# Patient Record
Sex: MALE | Race: WHITE | NOT HISPANIC OR LATINO | Employment: OTHER | URBAN - METROPOLITAN AREA
[De-identification: names, ages, dates, MRNs, and addresses within clinical notes are randomized per-mention and may not be internally consistent; named-entity substitution may affect disease eponyms.]

---

## 2020-01-16 ENCOUNTER — HOSPITAL ENCOUNTER (OUTPATIENT)
Dept: OTHER | Facility: HOSPITAL | Age: 55
Discharge: HOME | End: 2020-01-16
Attending: FAMILY MEDICINE

## 2020-01-16 ENCOUNTER — OFFICE VISIT CONVERTED (OUTPATIENT)
Dept: FAMILY MEDICINE CLINIC | Age: 55
End: 2020-01-16
Attending: FAMILY MEDICINE

## 2020-01-16 LAB
ALBUMIN SERPL-MCNC: 3.7 G/DL (ref 3.5–5)
ALBUMIN/GLOB SERPL: 0.9 {RATIO} (ref 1.4–2.6)
ALP SERPL-CCNC: 80 U/L (ref 56–119)
ALT SERPL-CCNC: 29 U/L (ref 10–40)
ANION GAP SERPL CALC-SCNC: 16 MMOL/L (ref 8–19)
AST SERPL-CCNC: 16 U/L (ref 15–50)
BASOPHILS # BLD MANUAL: 0.05 10*3/UL (ref 0–0.2)
BASOPHILS NFR BLD MANUAL: 0.4 % (ref 0–3)
BILIRUB SERPL-MCNC: 0.65 MG/DL (ref 0.2–1.3)
BUN SERPL-MCNC: 15 MG/DL (ref 5–25)
BUN/CREAT SERPL: 14 {RATIO} (ref 6–20)
CALCIUM SERPL-MCNC: 9.4 MG/DL (ref 8.7–10.4)
CHLORIDE SERPL-SCNC: 97 MMOL/L (ref 99–111)
CONV CO2: 24 MMOL/L (ref 22–32)
CONV TOTAL PROTEIN: 7.9 G/DL (ref 6.3–8.2)
CREAT UR-MCNC: 1.06 MG/DL (ref 0.7–1.2)
DEPRECATED RDW RBC AUTO: 44.1 FL
EOSINOPHIL # BLD MANUAL: 0.18 10*3/UL (ref 0–0.7)
EOSINOPHIL NFR BLD MANUAL: 1.5 % (ref 0–7)
ERYTHROCYTE [DISTWIDTH] IN BLOOD BY AUTOMATED COUNT: 12.9 % (ref 11.5–14.5)
GFR SERPLBLD BASED ON 1.73 SQ M-ARVRAT: >60 ML/MIN/{1.73_M2}
GLOBULIN UR ELPH-MCNC: 4.2 G/DL (ref 2–3.5)
GLUCOSE SERPL-MCNC: 135 MG/DL (ref 70–99)
GRANS (ABSOLUTE): 8.67 10*3/UL (ref 2–8)
GRANS: 72.4 % (ref 30–85)
HBA1C MFR BLD: 14.6 G/DL (ref 14–18)
HCT VFR BLD AUTO: 43.5 % (ref 42–52)
IMM GRANULOCYTES # BLD: 0.05 10*3/UL (ref 0–0.54)
IMM GRANULOCYTES NFR BLD: 0.4 % (ref 0–0.43)
LYMPHOCYTES # BLD MANUAL: 1.7 10*3/UL (ref 1–5)
LYMPHOCYTES NFR BLD MANUAL: 11.1 % (ref 3–10)
MCH RBC QN AUTO: 30.7 PG (ref 27–31)
MCHC RBC AUTO-ENTMCNC: 33.6 G/DL (ref 33–37)
MCV RBC AUTO: 91.4 FL (ref 80–96)
MONOCYTES # BLD AUTO: 1.33 10*3/UL (ref 0.2–1.2)
OSMOLALITY SERPL CALC.SUM OF ELEC: 277 MOSM/KG (ref 273–304)
PLATELET # BLD AUTO: 388 10*3/UL (ref 130–400)
PMV BLD AUTO: 10.6 FL (ref 7.4–10.4)
POTASSIUM SERPL-SCNC: 4.5 MMOL/L (ref 3.5–5.3)
RBC # BLD AUTO: 4.76 10*6/UL (ref 4.7–6.1)
SODIUM SERPL-SCNC: 132 MMOL/L (ref 135–147)
VARIANT LYMPHS NFR BLD MANUAL: 14.2 % (ref 20–45)
WBC # BLD AUTO: 11.98 10*3/UL (ref 4.8–10.8)

## 2020-07-25 ENCOUNTER — APPOINTMENT (OUTPATIENT)
Dept: GENERAL RADIOLOGY | Facility: HOSPITAL | Age: 55
End: 2020-07-25

## 2020-07-25 PROCEDURE — 93005 ELECTROCARDIOGRAM TRACING: CPT | Performed by: EMERGENCY MEDICINE

## 2020-07-25 PROCEDURE — 36415 COLL VENOUS BLD VENIPUNCTURE: CPT

## 2020-07-25 PROCEDURE — 93005 ELECTROCARDIOGRAM TRACING: CPT

## 2020-07-25 PROCEDURE — 99285 EMERGENCY DEPT VISIT HI MDM: CPT

## 2020-07-25 PROCEDURE — 71046 X-RAY EXAM CHEST 2 VIEWS: CPT

## 2020-07-25 PROCEDURE — 93010 ELECTROCARDIOGRAM REPORT: CPT | Performed by: INTERNAL MEDICINE

## 2020-07-25 RX ORDER — SODIUM CHLORIDE 0.9 % (FLUSH) 0.9 %
10 SYRINGE (ML) INJECTION AS NEEDED
Status: DISCONTINUED | OUTPATIENT
Start: 2020-07-25 | End: 2020-07-27 | Stop reason: HOSPADM

## 2020-07-25 RX ORDER — ASPIRIN 325 MG
325 TABLET ORAL ONCE
Status: DISCONTINUED | OUTPATIENT
Start: 2020-07-25 | End: 2020-07-26

## 2020-07-26 ENCOUNTER — HOSPITAL ENCOUNTER (INPATIENT)
Facility: HOSPITAL | Age: 55
LOS: 1 days | Discharge: HOME | End: 2020-07-27
Attending: EMERGENCY MEDICINE | Admitting: INTERNAL MEDICINE

## 2020-07-26 DIAGNOSIS — I10 HYPERTENSION, UNSPECIFIED TYPE: ICD-10-CM

## 2020-07-26 DIAGNOSIS — I21.4 NSTEMI (NON-ST ELEVATED MYOCARDIAL INFARCTION) (HCC): Primary | ICD-10-CM

## 2020-07-26 LAB
ALBUMIN SERPL-MCNC: 3.9 G/DL (ref 3.5–5.2)
ALBUMIN/GLOB SERPL: 1.3 G/DL
ALP SERPL-CCNC: 61 U/L (ref 39–117)
ALT SERPL W P-5'-P-CCNC: 24 U/L (ref 1–41)
ANION GAP SERPL CALCULATED.3IONS-SCNC: 6.5 MMOL/L (ref 5–15)
AST SERPL-CCNC: 14 U/L (ref 1–40)
B PARAPERT DNA SPEC QL NAA+PROBE: NOT DETECTED
B PERT DNA SPEC QL NAA+PROBE: NOT DETECTED
BASOPHILS # BLD AUTO: 0.07 10*3/MM3 (ref 0–0.2)
BASOPHILS NFR BLD AUTO: 0.6 % (ref 0–1.5)
BILIRUB SERPL-MCNC: 0.8 MG/DL (ref 0–1.2)
BUN SERPL-MCNC: 13 MG/DL (ref 6–20)
BUN/CREAT SERPL: 10.2 (ref 7–25)
C PNEUM DNA NPH QL NAA+NON-PROBE: NOT DETECTED
CALCIUM SPEC-SCNC: 9.3 MG/DL (ref 8.6–10.5)
CHLORIDE SERPL-SCNC: 102 MMOL/L (ref 98–107)
CO2 SERPL-SCNC: 27.5 MMOL/L (ref 22–29)
CREAT SERPL-MCNC: 1.28 MG/DL (ref 0.76–1.27)
DEPRECATED RDW RBC AUTO: 47 FL (ref 37–54)
EOSINOPHIL # BLD AUTO: 0.17 10*3/MM3 (ref 0–0.4)
EOSINOPHIL NFR BLD AUTO: 1.5 % (ref 0.3–6.2)
ERYTHROCYTE [DISTWIDTH] IN BLOOD BY AUTOMATED COUNT: 13.2 % (ref 12.3–15.4)
FLUAV H1 2009 PAND RNA NPH QL NAA+PROBE: NOT DETECTED
FLUAV H1 HA GENE NPH QL NAA+PROBE: NOT DETECTED
FLUAV H3 RNA NPH QL NAA+PROBE: NOT DETECTED
FLUAV SUBTYP SPEC NAA+PROBE: NOT DETECTED
FLUBV RNA ISLT QL NAA+PROBE: NOT DETECTED
GFR SERPL CREATININE-BSD FRML MDRD: 59 ML/MIN/1.73
GLOBULIN UR ELPH-MCNC: 2.9 GM/DL
GLUCOSE SERPL-MCNC: 180 MG/DL (ref 65–99)
HADV DNA SPEC NAA+PROBE: NOT DETECTED
HCOV 229E RNA SPEC QL NAA+PROBE: NOT DETECTED
HCOV HKU1 RNA SPEC QL NAA+PROBE: NOT DETECTED
HCOV NL63 RNA SPEC QL NAA+PROBE: NOT DETECTED
HCOV OC43 RNA SPEC QL NAA+PROBE: NOT DETECTED
HCT VFR BLD AUTO: 47 % (ref 37.5–51)
HGB BLD-MCNC: 15.6 G/DL (ref 13–17.7)
HMPV RNA NPH QL NAA+NON-PROBE: NOT DETECTED
HOLD SPECIMEN: NORMAL
HOLD SPECIMEN: NORMAL
HPIV1 RNA SPEC QL NAA+PROBE: NOT DETECTED
HPIV2 RNA SPEC QL NAA+PROBE: NOT DETECTED
HPIV3 RNA NPH QL NAA+PROBE: NOT DETECTED
HPIV4 P GENE NPH QL NAA+PROBE: NOT DETECTED
IMM GRANULOCYTES # BLD AUTO: 0.03 10*3/MM3 (ref 0–0.05)
IMM GRANULOCYTES NFR BLD AUTO: 0.3 % (ref 0–0.5)
LYMPHOCYTES # BLD AUTO: 1.84 10*3/MM3 (ref 0.7–3.1)
LYMPHOCYTES NFR BLD AUTO: 15.9 % (ref 19.6–45.3)
M PNEUMO IGG SER IA-ACNC: NOT DETECTED
MCH RBC QN AUTO: 31.6 PG (ref 26.6–33)
MCHC RBC AUTO-ENTMCNC: 33.2 G/DL (ref 31.5–35.7)
MCV RBC AUTO: 95.3 FL (ref 79–97)
MONOCYTES # BLD AUTO: 0.76 10*3/MM3 (ref 0.1–0.9)
MONOCYTES NFR BLD AUTO: 6.6 % (ref 5–12)
NEUTROPHILS NFR BLD AUTO: 75.1 % (ref 42.7–76)
NEUTROPHILS NFR BLD AUTO: 8.69 10*3/MM3 (ref 1.7–7)
NRBC BLD AUTO-RTO: 0 /100 WBC (ref 0–0.2)
PLATELET # BLD AUTO: 228 10*3/MM3 (ref 140–450)
PMV BLD AUTO: 10.5 FL (ref 6–12)
POTASSIUM SERPL-SCNC: 4.1 MMOL/L (ref 3.5–5.2)
PROT SERPL-MCNC: 6.8 G/DL (ref 6–8.5)
RBC # BLD AUTO: 4.93 10*6/MM3 (ref 4.14–5.8)
RHINOVIRUS RNA SPEC NAA+PROBE: NOT DETECTED
RSV RNA NPH QL NAA+NON-PROBE: NOT DETECTED
SARS-COV-2 RNA NPH QL NAA+NON-PROBE: NOT DETECTED
SODIUM SERPL-SCNC: 136 MMOL/L (ref 136–145)
TROPONIN T SERPL-MCNC: 0.14 NG/ML (ref 0–0.03)
TROPONIN T SERPL-MCNC: 0.16 NG/ML (ref 0–0.03)
TROPONIN T SERPL-MCNC: 0.23 NG/ML (ref 0–0.03)
WBC # BLD AUTO: 11.56 10*3/MM3 (ref 3.4–10.8)
WHOLE BLOOD HOLD SPECIMEN: NORMAL
WHOLE BLOOD HOLD SPECIMEN: NORMAL

## 2020-07-26 PROCEDURE — C1769 GUIDE WIRE: HCPCS | Performed by: INTERNAL MEDICINE

## 2020-07-26 PROCEDURE — 0202U NFCT DS 22 TRGT SARS-COV-2: CPT | Performed by: INTERNAL MEDICINE

## 2020-07-26 PROCEDURE — 4A023N7 MEASUREMENT OF CARDIAC SAMPLING AND PRESSURE, LEFT HEART, PERCUTANEOUS APPROACH: ICD-10-PCS | Performed by: INTERNAL MEDICINE

## 2020-07-26 PROCEDURE — C1894 INTRO/SHEATH, NON-LASER: HCPCS | Performed by: INTERNAL MEDICINE

## 2020-07-26 PROCEDURE — 93458 L HRT ARTERY/VENTRICLE ANGIO: CPT | Performed by: INTERNAL MEDICINE

## 2020-07-26 PROCEDURE — 25010000002 MIDAZOLAM PER 1 MG: Performed by: INTERNAL MEDICINE

## 2020-07-26 PROCEDURE — 99152 MOD SED SAME PHYS/QHP 5/>YRS: CPT | Performed by: INTERNAL MEDICINE

## 2020-07-26 PROCEDURE — 99153 MOD SED SAME PHYS/QHP EA: CPT | Performed by: INTERNAL MEDICINE

## 2020-07-26 PROCEDURE — C1874 STENT, COATED/COV W/DEL SYS: HCPCS | Performed by: INTERNAL MEDICINE

## 2020-07-26 PROCEDURE — 84484 ASSAY OF TROPONIN QUANT: CPT | Performed by: EMERGENCY MEDICINE

## 2020-07-26 PROCEDURE — 25010000002 ENOXAPARIN PER 10 MG: Performed by: EMERGENCY MEDICINE

## 2020-07-26 PROCEDURE — C1887 CATHETER, GUIDING: HCPCS | Performed by: INTERNAL MEDICINE

## 2020-07-26 PROCEDURE — 25010000002 HEPARIN (PORCINE) PER 1000 UNITS: Performed by: INTERNAL MEDICINE

## 2020-07-26 PROCEDURE — 027034Z DILATION OF CORONARY ARTERY, ONE ARTERY WITH DRUG-ELUTING INTRALUMINAL DEVICE, PERCUTANEOUS APPROACH: ICD-10-PCS | Performed by: INTERNAL MEDICINE

## 2020-07-26 PROCEDURE — 93005 ELECTROCARDIOGRAM TRACING: CPT | Performed by: INTERNAL MEDICINE

## 2020-07-26 PROCEDURE — C1725 CATH, TRANSLUMIN NON-LASER: HCPCS | Performed by: INTERNAL MEDICINE

## 2020-07-26 PROCEDURE — 99223 1ST HOSP IP/OBS HIGH 75: CPT | Performed by: INTERNAL MEDICINE

## 2020-07-26 PROCEDURE — 85025 COMPLETE CBC W/AUTO DIFF WBC: CPT

## 2020-07-26 PROCEDURE — 92928 PRQ TCAT PLMT NTRAC ST 1 LES: CPT | Performed by: INTERNAL MEDICINE

## 2020-07-26 PROCEDURE — 25010000002 FENTANYL CITRATE (PF) 100 MCG/2ML SOLUTION: Performed by: INTERNAL MEDICINE

## 2020-07-26 PROCEDURE — 85347 COAGULATION TIME ACTIVATED: CPT

## 2020-07-26 PROCEDURE — B2111ZZ FLUOROSCOPY OF MULTIPLE CORONARY ARTERIES USING LOW OSMOLAR CONTRAST: ICD-10-PCS | Performed by: INTERNAL MEDICINE

## 2020-07-26 PROCEDURE — 84484 ASSAY OF TROPONIN QUANT: CPT | Performed by: INTERNAL MEDICINE

## 2020-07-26 PROCEDURE — 84484 ASSAY OF TROPONIN QUANT: CPT

## 2020-07-26 PROCEDURE — 93010 ELECTROCARDIOGRAM REPORT: CPT | Performed by: INTERNAL MEDICINE

## 2020-07-26 PROCEDURE — 0 IOPAMIDOL PER 1 ML: Performed by: INTERNAL MEDICINE

## 2020-07-26 PROCEDURE — 80053 COMPREHEN METABOLIC PANEL: CPT

## 2020-07-26 PROCEDURE — C9600 PERC DRUG-EL COR STENT SING: HCPCS | Performed by: INTERNAL MEDICINE

## 2020-07-26 DEVICE — XIENCE SIERRA™ EVEROLIMUS ELUTING CORONARY STENT SYSTEM 3.50 MM X 18 MM / RAPID-EXCHANGE
Type: IMPLANTABLE DEVICE | Status: FUNCTIONAL
Brand: XIENCE SIERRA™

## 2020-07-26 RX ORDER — ASPIRIN 81 MG/1
TABLET, CHEWABLE ORAL AS NEEDED
Status: DISCONTINUED | OUTPATIENT
Start: 2020-07-26 | End: 2020-07-26 | Stop reason: HOSPADM

## 2020-07-26 RX ORDER — CLOPIDOGREL BISULFATE 75 MG/1
75 TABLET ORAL DAILY
Status: DISCONTINUED | OUTPATIENT
Start: 2020-07-27 | End: 2020-07-27 | Stop reason: HOSPADM

## 2020-07-26 RX ORDER — FENTANYL CITRATE 50 UG/ML
INJECTION, SOLUTION INTRAMUSCULAR; INTRAVENOUS AS NEEDED
Status: DISCONTINUED | OUTPATIENT
Start: 2020-07-26 | End: 2020-07-26 | Stop reason: HOSPADM

## 2020-07-26 RX ORDER — ATORVASTATIN CALCIUM 20 MG/1
40 TABLET, FILM COATED ORAL NIGHTLY
Status: DISCONTINUED | OUTPATIENT
Start: 2020-07-26 | End: 2020-07-27 | Stop reason: HOSPADM

## 2020-07-26 RX ORDER — MIDAZOLAM HYDROCHLORIDE 1 MG/ML
INJECTION INTRAMUSCULAR; INTRAVENOUS AS NEEDED
Status: DISCONTINUED | OUTPATIENT
Start: 2020-07-26 | End: 2020-07-26 | Stop reason: HOSPADM

## 2020-07-26 RX ORDER — SODIUM CHLORIDE 9 MG/ML
INJECTION, SOLUTION INTRAVENOUS CONTINUOUS PRN
Status: COMPLETED | OUTPATIENT
Start: 2020-07-26 | End: 2020-07-26

## 2020-07-26 RX ORDER — ONDANSETRON 4 MG/1
4 TABLET, FILM COATED ORAL EVERY 6 HOURS PRN
Status: DISCONTINUED | OUTPATIENT
Start: 2020-07-26 | End: 2020-07-27 | Stop reason: HOSPADM

## 2020-07-26 RX ORDER — CLOPIDOGREL BISULFATE 75 MG/1
TABLET ORAL AS NEEDED
Status: DISCONTINUED | OUTPATIENT
Start: 2020-07-26 | End: 2020-07-26 | Stop reason: HOSPADM

## 2020-07-26 RX ORDER — NITROGLYCERIN 0.4 MG/1
0.4 TABLET SUBLINGUAL
Status: DISCONTINUED | OUTPATIENT
Start: 2020-07-26 | End: 2020-07-27 | Stop reason: HOSPADM

## 2020-07-26 RX ORDER — ACETAMINOPHEN 650 MG/1
650 SUPPOSITORY RECTAL EVERY 4 HOURS PRN
Status: DISCONTINUED | OUTPATIENT
Start: 2020-07-26 | End: 2020-07-27 | Stop reason: HOSPADM

## 2020-07-26 RX ORDER — CARVEDILOL 3.12 MG/1
3.12 TABLET ORAL EVERY 12 HOURS SCHEDULED
Status: DISCONTINUED | OUTPATIENT
Start: 2020-07-26 | End: 2020-07-27 | Stop reason: HOSPADM

## 2020-07-26 RX ORDER — ACETAMINOPHEN 325 MG/1
650 TABLET ORAL EVERY 4 HOURS PRN
Status: DISCONTINUED | OUTPATIENT
Start: 2020-07-26 | End: 2020-07-27 | Stop reason: HOSPADM

## 2020-07-26 RX ORDER — SODIUM CHLORIDE 0.9 % (FLUSH) 0.9 %
10 SYRINGE (ML) INJECTION AS NEEDED
Status: DISCONTINUED | OUTPATIENT
Start: 2020-07-26 | End: 2020-07-27 | Stop reason: HOSPADM

## 2020-07-26 RX ORDER — LIDOCAINE HYDROCHLORIDE 20 MG/ML
INJECTION, SOLUTION INFILTRATION; PERINEURAL AS NEEDED
Status: DISCONTINUED | OUTPATIENT
Start: 2020-07-26 | End: 2020-07-26 | Stop reason: HOSPADM

## 2020-07-26 RX ORDER — ASPIRIN 81 MG/1
81 TABLET ORAL DAILY
Status: DISCONTINUED | OUTPATIENT
Start: 2020-07-26 | End: 2020-07-27 | Stop reason: HOSPADM

## 2020-07-26 RX ORDER — SODIUM CHLORIDE 0.9 % (FLUSH) 0.9 %
10 SYRINGE (ML) INJECTION EVERY 12 HOURS SCHEDULED
Status: DISCONTINUED | OUTPATIENT
Start: 2020-07-26 | End: 2020-07-27 | Stop reason: HOSPADM

## 2020-07-26 RX ORDER — SODIUM CHLORIDE 9 MG/ML
100 INJECTION, SOLUTION INTRAVENOUS CONTINUOUS
Status: ACTIVE | OUTPATIENT
Start: 2020-07-26 | End: 2020-07-26

## 2020-07-26 RX ORDER — ONDANSETRON 2 MG/ML
4 INJECTION INTRAMUSCULAR; INTRAVENOUS EVERY 6 HOURS PRN
Status: DISCONTINUED | OUTPATIENT
Start: 2020-07-26 | End: 2020-07-27 | Stop reason: HOSPADM

## 2020-07-26 RX ORDER — ACETAMINOPHEN 160 MG/5ML
650 SOLUTION ORAL EVERY 4 HOURS PRN
Status: DISCONTINUED | OUTPATIENT
Start: 2020-07-26 | End: 2020-07-27 | Stop reason: HOSPADM

## 2020-07-26 RX ORDER — HEPARIN SODIUM 1000 [USP'U]/ML
INJECTION, SOLUTION INTRAVENOUS; SUBCUTANEOUS AS NEEDED
Status: DISCONTINUED | OUTPATIENT
Start: 2020-07-26 | End: 2020-07-26 | Stop reason: HOSPADM

## 2020-07-26 RX ADMIN — ACETAMINOPHEN 650 MG: 325 TABLET, FILM COATED ORAL at 23:32

## 2020-07-26 RX ADMIN — ENOXAPARIN SODIUM 80 MG: 80 INJECTION SUBCUTANEOUS at 03:21

## 2020-07-26 RX ADMIN — CARVEDILOL 3.12 MG: 3.12 TABLET, FILM COATED ORAL at 21:21

## 2020-07-26 RX ADMIN — NITROGLYCERIN 1 INCH: 20 OINTMENT TOPICAL at 02:28

## 2020-07-26 RX ADMIN — ATORVASTATIN CALCIUM 40 MG: 20 TABLET, FILM COATED ORAL at 21:21

## 2020-07-26 RX ADMIN — SODIUM CHLORIDE, PRESERVATIVE FREE 10 ML: 5 INJECTION INTRAVENOUS at 21:21

## 2020-07-26 RX ADMIN — SODIUM CHLORIDE, PRESERVATIVE FREE 10 ML: 5 INJECTION INTRAVENOUS at 21:20

## 2020-07-26 RX ADMIN — ACETAMINOPHEN 650 MG: 325 TABLET, FILM COATED ORAL at 13:53

## 2020-07-26 RX ADMIN — NITROGLYCERIN 0.4 MG: 0.4 TABLET SUBLINGUAL at 02:28

## 2020-07-26 RX ADMIN — CARVEDILOL 3.12 MG: 3.12 TABLET, FILM COATED ORAL at 10:31

## 2020-07-27 ENCOUNTER — APPOINTMENT (OUTPATIENT)
Dept: CARDIOLOGY | Facility: HOSPITAL | Age: 55
End: 2020-07-27

## 2020-07-27 VITALS
HEART RATE: 56 BPM | RESPIRATION RATE: 16 BRPM | TEMPERATURE: 97.5 F | SYSTOLIC BLOOD PRESSURE: 151 MMHG | DIASTOLIC BLOOD PRESSURE: 84 MMHG | BODY MASS INDEX: 26.77 KG/M2 | OXYGEN SATURATION: 98 % | HEIGHT: 70 IN | WEIGHT: 187 LBS

## 2020-07-27 LAB
ANION GAP SERPL CALCULATED.3IONS-SCNC: 7.3 MMOL/L (ref 5–15)
AORTIC DIMENSIONLESS INDEX: 0.9 (DI)
ASCENDING AORTA: 3.5 CM
BH CV ECHO MEAS - ACS: 2 CM
BH CV ECHO MEAS - AO MAX PG: 9.7 MMHG
BH CV ECHO MEAS - AO MEAN PG (FULL): 1 MMHG
BH CV ECHO MEAS - AO MEAN PG: 4 MMHG
BH CV ECHO MEAS - AO ROOT AREA (BSA CORRECTED): 1.1
BH CV ECHO MEAS - AO ROOT AREA: 4.2 CM^2
BH CV ECHO MEAS - AO ROOT DIAM: 2.3 CM
BH CV ECHO MEAS - AO V2 MAX: 155.8 CM/SEC
BH CV ECHO MEAS - AO V2 MEAN: 91.2 CM/SEC
BH CV ECHO MEAS - AO V2 VTI: 24 CM
BH CV ECHO MEAS - ASC AORTA: 2.9 CM
BH CV ECHO MEAS - AVA(I,A): 3.2 CM^2
BH CV ECHO MEAS - AVA(I,D): 3.2 CM^2
BH CV ECHO MEAS - BSA(HAYCOCK): 2.1 M^2
BH CV ECHO MEAS - BSA: 2 M^2
BH CV ECHO MEAS - BZI_BMI: 26.8 KILOGRAMS/M^2
BH CV ECHO MEAS - BZI_METRIC_HEIGHT: 177.8 CM
BH CV ECHO MEAS - BZI_METRIC_WEIGHT: 84.8 KG
BH CV ECHO MEAS - EDV(CUBED): 74.1 ML
BH CV ECHO MEAS - EDV(MOD-SP2): 108 ML
BH CV ECHO MEAS - EDV(MOD-SP4): 134 ML
BH CV ECHO MEAS - EDV(TEICH): 78.6 ML
BH CV ECHO MEAS - EF(CUBED): 70.4 %
BH CV ECHO MEAS - EF(MOD-BP): 70 %
BH CV ECHO MEAS - EF(MOD-SP2): 71.3 %
BH CV ECHO MEAS - EF(MOD-SP4): 70.1 %
BH CV ECHO MEAS - EF(TEICH): 62.4 %
BH CV ECHO MEAS - ESV(CUBED): 22 ML
BH CV ECHO MEAS - ESV(MOD-SP2): 31 ML
BH CV ECHO MEAS - ESV(MOD-SP4): 40 ML
BH CV ECHO MEAS - ESV(TEICH): 29.6 ML
BH CV ECHO MEAS - FS: 33.3 %
BH CV ECHO MEAS - IVS/LVPW: 1.1
BH CV ECHO MEAS - IVSD: 1.6 CM
BH CV ECHO MEAS - LAT PEAK E' VEL: 10.2 CM/SEC
BH CV ECHO MEAS - LV DIASTOLIC VOL/BSA (35-75): 66.1 ML/M^2
BH CV ECHO MEAS - LV MASS(C)D: 262.6 GRAMS
BH CV ECHO MEAS - LV MASS(C)DI: 129.5 GRAMS/M^2
BH CV ECHO MEAS - LV MAX PG: 7.1 MMHG
BH CV ECHO MEAS - LV MEAN PG: 3 MMHG
BH CV ECHO MEAS - LV SYSTOLIC VOL/BSA (12-30): 19.7 ML/M^2
BH CV ECHO MEAS - LV V1 MAX: 133.3 CM/SEC
BH CV ECHO MEAS - LV V1 MEAN: 75 CM/SEC
BH CV ECHO MEAS - LV V1 VTI: 22.4 CM
BH CV ECHO MEAS - LVIDD: 4.2 CM
BH CV ECHO MEAS - LVIDS: 2.8 CM
BH CV ECHO MEAS - LVLD AP2: 9.5 CM
BH CV ECHO MEAS - LVLD AP4: 10.5 CM
BH CV ECHO MEAS - LVLS AP2: 7.7 CM
BH CV ECHO MEAS - LVLS AP4: 8.8 CM
BH CV ECHO MEAS - LVOT AREA (M): 3.5 CM^2
BH CV ECHO MEAS - LVOT AREA: 3.5 CM^2
BH CV ECHO MEAS - LVOT DIAM: 2.1 CM
BH CV ECHO MEAS - LVPWD: 1.5 CM
BH CV ECHO MEAS - MED PEAK E' VEL: 8.55 CM/SEC
BH CV ECHO MEAS - MV A DUR: 0.15 SEC
BH CV ECHO MEAS - MV A MAX VEL: 82.4 CM/SEC
BH CV ECHO MEAS - MV DEC SLOPE: 407 CM/SEC^2
BH CV ECHO MEAS - MV DEC TIME: 204 SEC
BH CV ECHO MEAS - MV E MAX VEL: 59.2 CM/SEC
BH CV ECHO MEAS - MV E/A: 0.72
BH CV ECHO MEAS - MV MEAN PG: 1 MMHG
BH CV ECHO MEAS - MV P1/2T MAX VEL: 102 CM/SEC
BH CV ECHO MEAS - MV P1/2T: 73.4 MSEC
BH CV ECHO MEAS - MV V2 MEAN: 54.5 CM/SEC
BH CV ECHO MEAS - MV V2 VTI: 36.6 CM
BH CV ECHO MEAS - MVA P1/2T LCG: 2.2 CM^2
BH CV ECHO MEAS - MVA(P1/2T): 3 CM^2
BH CV ECHO MEAS - MVA(VTI): 2.1 CM^2
BH CV ECHO MEAS - PA ACC SLOPE: 23.1 CM/SEC^2
BH CV ECHO MEAS - PA ACC TIME: 0.18 SEC
BH CV ECHO MEAS - PA MAX PG (FULL): 1.1 MMHG
BH CV ECHO MEAS - PA MAX PG: 2.1 MMHG
BH CV ECHO MEAS - PA PR(ACCEL): -0.2 MMHG
BH CV ECHO MEAS - PA V2 MAX: 71.9 CM/SEC
BH CV ECHO MEAS - PULM A REVS DUR: 0.14 SEC
BH CV ECHO MEAS - PULM A REVS VEL: 21.6 CM/SEC
BH CV ECHO MEAS - PULM DIAS VEL: 48.2 CM/SEC
BH CV ECHO MEAS - PULM S/D: 1
BH CV ECHO MEAS - PULM SYS VEL: 48.2 CM/SEC
BH CV ECHO MEAS - PVA(V,A): 1.7 CM^2
BH CV ECHO MEAS - PVA(V,D): 1.7 CM^2
BH CV ECHO MEAS - QP/QS: 0.26
BH CV ECHO MEAS - RV MAX PG: 0.98 MMHG
BH CV ECHO MEAS - RV MEAN PG: 0 MMHG
BH CV ECHO MEAS - RV V1 MAX: 49.4 CM/SEC
BH CV ECHO MEAS - RV V1 MEAN: 31.1 CM/SEC
BH CV ECHO MEAS - RV V1 VTI: 8.1 CM
BH CV ECHO MEAS - RVOT AREA: 2.5 CM^2
BH CV ECHO MEAS - RVOT DIAM: 1.8 CM
BH CV ECHO MEAS - SI(AO): 49.2 ML/M^2
BH CV ECHO MEAS - SI(CUBED): 25.7 ML/M^2
BH CV ECHO MEAS - SI(LVOT): 38.2 ML/M^2
BH CV ECHO MEAS - SI(MOD-SP2): 38 ML/M^2
BH CV ECHO MEAS - SI(MOD-SP4): 46.3 ML/M^2
BH CV ECHO MEAS - SI(TEICH): 24.2 ML/M^2
BH CV ECHO MEAS - SV(AO): 99.7 ML
BH CV ECHO MEAS - SV(CUBED): 52.1 ML
BH CV ECHO MEAS - SV(LVOT): 77.6 ML
BH CV ECHO MEAS - SV(MOD-SP2): 77 ML
BH CV ECHO MEAS - SV(MOD-SP4): 94 ML
BH CV ECHO MEAS - SV(RVOT): 20.5 ML
BH CV ECHO MEAS - SV(TEICH): 49 ML
BH CV ECHO MEAS - TAPSE (>1.6): 2.8 CM2
BH CV ECHO MEASUREMENTS AVERAGE E/E' RATIO: 6.31
BH CV VAS BP RIGHT ARM: NORMAL MMHG
BH CV XLRA - RV BASE: 3.4 CM
BH CV XLRA - RV LENGTH: 6.7 CM
BH CV XLRA - RV MID: 2.1 CM
BH CV XLRA - TDI S': 15.9 CM/SEC
BUN SERPL-MCNC: 13 MG/DL (ref 6–20)
BUN/CREAT SERPL: 11.4 (ref 7–25)
CALCIUM SPEC-SCNC: 8.6 MG/DL (ref 8.6–10.5)
CHLORIDE SERPL-SCNC: 105 MMOL/L (ref 98–107)
CHOLEST SERPL-MCNC: 85 MG/DL (ref 0–200)
CO2 SERPL-SCNC: 24.7 MMOL/L (ref 22–29)
CREAT SERPL-MCNC: 1.14 MG/DL (ref 0.76–1.27)
GFR SERPL CREATININE-BSD FRML MDRD: 67 ML/MIN/1.73
GLUCOSE SERPL-MCNC: 122 MG/DL (ref 65–99)
HDLC SERPL-MCNC: 27 MG/DL (ref 40–60)
LDLC SERPL CALC-MCNC: 38 MG/DL (ref 0–100)
LDLC/HDLC SERPL: 1.39 {RATIO}
MAXIMAL PREDICTED HEART RATE: 166 BPM
POTASSIUM SERPL-SCNC: 4 MMOL/L (ref 3.5–5.2)
SINUS: 2.94 CM
SODIUM SERPL-SCNC: 137 MMOL/L (ref 136–145)
STJ: 3 CM
STRESS TARGET HR: 141 BPM
TRIGL SERPL-MCNC: 102 MG/DL (ref 0–150)
VLDLC SERPL-MCNC: 20.4 MG/DL (ref 5–40)

## 2020-07-27 PROCEDURE — 93306 TTE W/DOPPLER COMPLETE: CPT

## 2020-07-27 PROCEDURE — 80061 LIPID PANEL: CPT | Performed by: INTERNAL MEDICINE

## 2020-07-27 PROCEDURE — 80048 BASIC METABOLIC PNL TOTAL CA: CPT | Performed by: INTERNAL MEDICINE

## 2020-07-27 PROCEDURE — 93005 ELECTROCARDIOGRAM TRACING: CPT | Performed by: INTERNAL MEDICINE

## 2020-07-27 PROCEDURE — 93010 ELECTROCARDIOGRAM REPORT: CPT | Performed by: INTERNAL MEDICINE

## 2020-07-27 PROCEDURE — 93306 TTE W/DOPPLER COMPLETE: CPT | Performed by: INTERNAL MEDICINE

## 2020-07-27 PROCEDURE — 99239 HOSP IP/OBS DSCHRG MGMT >30: CPT | Performed by: INTERNAL MEDICINE

## 2020-07-27 PROCEDURE — 25010000002 PERFLUTREN (DEFINITY) 8.476 MG IN SODIUM CHLORIDE 0.9 % 10 ML INJECTION: Performed by: INTERNAL MEDICINE

## 2020-07-27 RX ORDER — CLOPIDOGREL BISULFATE 75 MG/1
75 TABLET ORAL DAILY
Qty: 90 TABLET | Refills: 3 | Status: ON HOLD | OUTPATIENT
Start: 2020-07-27 | End: 2021-02-25 | Stop reason: SDUPTHER

## 2020-07-27 RX ORDER — ATORVASTATIN CALCIUM 40 MG/1
40 TABLET, FILM COATED ORAL NIGHTLY
Qty: 90 TABLET | Refills: 3 | Status: ON HOLD | OUTPATIENT
Start: 2020-07-27 | End: 2021-02-25 | Stop reason: SDUPTHER

## 2020-07-27 RX ORDER — CARVEDILOL 3.12 MG/1
3.12 TABLET ORAL EVERY 12 HOURS SCHEDULED
Qty: 180 TABLET | Refills: 3 | Status: SHIPPED | OUTPATIENT
Start: 2020-07-27 | End: 2021-02-25 | Stop reason: HOSPADM

## 2020-07-27 RX ORDER — ASPIRIN 81 MG/1
81 TABLET ORAL DAILY
Qty: 90 TABLET | Refills: 3 | Status: SHIPPED | OUTPATIENT
Start: 2020-07-27 | End: 2022-03-11 | Stop reason: SDUPTHER

## 2020-07-27 RX ORDER — NITROGLYCERIN 0.4 MG/1
0.4 TABLET SUBLINGUAL
Qty: 25 TABLET | Refills: 12 | Status: SHIPPED | OUTPATIENT
Start: 2020-07-27 | End: 2021-02-25 | Stop reason: HOSPADM

## 2020-07-27 RX ADMIN — CARVEDILOL 3.12 MG: 3.12 TABLET, FILM COATED ORAL at 08:20

## 2020-07-27 RX ADMIN — ASPIRIN 81 MG: 81 TABLET, COATED ORAL at 08:20

## 2020-07-27 RX ADMIN — PERFLUTREN 8 ML: 6.52 INJECTION, SUSPENSION INTRAVENOUS at 08:43

## 2020-07-27 RX ADMIN — CLOPIDOGREL 75 MG: 75 TABLET, FILM COATED ORAL at 08:20

## 2020-07-28 ENCOUNTER — READMISSION MANAGEMENT (OUTPATIENT)
Dept: CALL CENTER | Facility: HOSPITAL | Age: 55
End: 2020-07-28

## 2020-07-29 ENCOUNTER — READMISSION MANAGEMENT (OUTPATIENT)
Dept: CALL CENTER | Facility: HOSPITAL | Age: 55
End: 2020-07-29

## 2020-07-29 NOTE — OUTREACH NOTE
AMI Week 1 Survey      Responses   [facility] patient discharged from?  [place]   Does the patient have one of the following disease processes/diagnoses(primary or secondary)?  Acute MI (STEMI,NSTEMI)   Is there a successful TCM telephone encounter documented?  No   Week 1 attempt successful?  No   Unsuccessful attempts  Attempt 1          Yanira Whaley RN

## 2020-07-30 ENCOUNTER — TELEPHONE (OUTPATIENT)
Dept: CARDIAC REHAB | Facility: HOSPITAL | Age: 55
End: 2020-07-30

## 2020-07-31 LAB — ACT BLD: 235 SECONDS (ref 82–152)

## 2020-08-03 ENCOUNTER — READMISSION MANAGEMENT (OUTPATIENT)
Dept: CALL CENTER | Facility: HOSPITAL | Age: 55
End: 2020-08-03

## 2020-08-03 NOTE — OUTREACH NOTE
AMI Week 1 Survey      Responses   [facility] patient discharged from?  [place]   Does the patient have one of the following disease processes/diagnoses(primary or secondary)?  Acute MI (STEMI,NSTEMI)   Is there a successful TCM telephone encounter documented?  No   Week 1 attempt successful?  No   Unsuccessful attempts  Attempt 2          Julieta Figueroa RN

## 2020-08-04 ENCOUNTER — OFFICE VISIT CONVERTED (OUTPATIENT)
Dept: FAMILY MEDICINE CLINIC | Age: 55
End: 2020-08-04
Attending: FAMILY MEDICINE

## 2020-08-06 ENCOUNTER — OFFICE VISIT CONVERTED (OUTPATIENT)
Dept: FAMILY MEDICINE CLINIC | Age: 55
End: 2020-08-06
Attending: FAMILY MEDICINE

## 2020-08-10 PROBLEM — E78.2 MIXED HYPERLIPIDEMIA: Status: ACTIVE | Noted: 2020-08-10

## 2020-08-10 PROBLEM — I21.4 NSTEMI (NON-ST ELEVATED MYOCARDIAL INFARCTION): Status: RESOLVED | Noted: 2020-07-26 | Resolved: 2020-08-10

## 2020-08-10 PROBLEM — I25.10 CORONARY ARTERY DISEASE INVOLVING NATIVE CORONARY ARTERY OF NATIVE HEART WITHOUT ANGINA PECTORIS: Status: ACTIVE | Noted: 2020-08-10

## 2020-08-10 PROBLEM — I10 ESSENTIAL HYPERTENSION: Status: ACTIVE | Noted: 2020-08-10

## 2021-02-23 ENCOUNTER — APPOINTMENT (OUTPATIENT)
Dept: CT IMAGING | Facility: HOSPITAL | Age: 56
End: 2021-02-23

## 2021-02-23 ENCOUNTER — HOSPITAL ENCOUNTER (OUTPATIENT)
Facility: HOSPITAL | Age: 56
Setting detail: OBSERVATION
Discharge: HOME | End: 2021-02-25
Attending: EMERGENCY MEDICINE | Admitting: INTERNAL MEDICINE

## 2021-02-23 ENCOUNTER — OFFICE VISIT CONVERTED (OUTPATIENT)
Dept: FAMILY MEDICINE CLINIC | Age: 56
End: 2021-02-23
Attending: FAMILY MEDICINE

## 2021-02-23 ENCOUNTER — APPOINTMENT (OUTPATIENT)
Dept: GENERAL RADIOLOGY | Facility: HOSPITAL | Age: 56
End: 2021-02-23

## 2021-02-23 DIAGNOSIS — I10 ELEVATED BLOOD PRESSURE READING WITH DIAGNOSIS OF HYPERTENSION: ICD-10-CM

## 2021-02-23 DIAGNOSIS — Z72.0 TOBACCO ABUSE: ICD-10-CM

## 2021-02-23 DIAGNOSIS — R07.9 CHEST PAIN IN ADULT: Primary | ICD-10-CM

## 2021-02-23 DIAGNOSIS — Z91.199 NON COMPLIANCE WITH MEDICAL TREATMENT: ICD-10-CM

## 2021-02-23 LAB
ALBUMIN SERPL-MCNC: 4.3 G/DL (ref 3.5–5.2)
ALBUMIN/GLOB SERPL: 1.6 G/DL
ALP SERPL-CCNC: 77 U/L (ref 39–117)
ALT SERPL W P-5'-P-CCNC: 29 U/L (ref 1–41)
ANION GAP SERPL CALCULATED.3IONS-SCNC: 8 MMOL/L (ref 5–15)
APTT PPP: 30.7 SECONDS (ref 22.7–35.4)
AST SERPL-CCNC: 17 U/L (ref 1–40)
BASOPHILS # BLD AUTO: 0.08 10*3/MM3 (ref 0–0.2)
BASOPHILS NFR BLD AUTO: 0.8 % (ref 0–1.5)
BILIRUB SERPL-MCNC: 1 MG/DL (ref 0–1.2)
BUN SERPL-MCNC: 14 MG/DL (ref 6–20)
BUN/CREAT SERPL: 13.9 (ref 7–25)
CALCIUM SPEC-SCNC: 8.8 MG/DL (ref 8.6–10.5)
CHLORIDE SERPL-SCNC: 101 MMOL/L (ref 98–107)
CO2 SERPL-SCNC: 26 MMOL/L (ref 22–29)
CREAT SERPL-MCNC: 1.01 MG/DL (ref 0.76–1.27)
DEPRECATED RDW RBC AUTO: 44.4 FL (ref 37–54)
EOSINOPHIL # BLD AUTO: 0.2 10*3/MM3 (ref 0–0.4)
EOSINOPHIL NFR BLD AUTO: 2 % (ref 0.3–6.2)
ERYTHROCYTE [DISTWIDTH] IN BLOOD BY AUTOMATED COUNT: 13.2 % (ref 12.3–15.4)
GFR SERPL CREATININE-BSD FRML MDRD: 77 ML/MIN/1.73
GLOBULIN UR ELPH-MCNC: 2.7 GM/DL
GLUCOSE SERPL-MCNC: 193 MG/DL (ref 65–99)
HCT VFR BLD AUTO: 48.6 % (ref 37.5–51)
HGB BLD-MCNC: 16.4 G/DL (ref 13–17.7)
IMM GRANULOCYTES # BLD AUTO: 0.04 10*3/MM3 (ref 0–0.05)
IMM GRANULOCYTES NFR BLD AUTO: 0.4 % (ref 0–0.5)
INR PPP: 1.07 (ref 0.9–1.1)
LIPASE SERPL-CCNC: 28 U/L (ref 13–60)
LYMPHOCYTES # BLD AUTO: 2.04 10*3/MM3 (ref 0.7–3.1)
LYMPHOCYTES NFR BLD AUTO: 20.9 % (ref 19.6–45.3)
MCH RBC QN AUTO: 31.2 PG (ref 26.6–33)
MCHC RBC AUTO-ENTMCNC: 33.7 G/DL (ref 31.5–35.7)
MCV RBC AUTO: 92.4 FL (ref 79–97)
MONOCYTES # BLD AUTO: 0.96 10*3/MM3 (ref 0.1–0.9)
MONOCYTES NFR BLD AUTO: 9.8 % (ref 5–12)
NEUTROPHILS NFR BLD AUTO: 6.44 10*3/MM3 (ref 1.7–7)
NEUTROPHILS NFR BLD AUTO: 66.1 % (ref 42.7–76)
NRBC BLD AUTO-RTO: 0 /100 WBC (ref 0–0.2)
NT-PROBNP SERPL-MCNC: 179.6 PG/ML (ref 0–900)
PLATELET # BLD AUTO: 306 10*3/MM3 (ref 140–450)
PMV BLD AUTO: 10.4 FL (ref 6–12)
POTASSIUM SERPL-SCNC: 4.1 MMOL/L (ref 3.5–5.2)
PROT SERPL-MCNC: 7 G/DL (ref 6–8.5)
PROTHROMBIN TIME: 13.7 SECONDS (ref 11.7–14.2)
QT INTERVAL: 416 MS
RBC # BLD AUTO: 5.26 10*6/MM3 (ref 4.14–5.8)
SARS-COV-2 ORF1AB RESP QL NAA+PROBE: NOT DETECTED
SODIUM SERPL-SCNC: 135 MMOL/L (ref 136–145)
TROPONIN T SERPL-MCNC: <0.01 NG/ML (ref 0–0.03)
TROPONIN T SERPL-MCNC: <0.01 NG/ML (ref 0–0.03)
WBC # BLD AUTO: 9.76 10*3/MM3 (ref 3.4–10.8)

## 2021-02-23 PROCEDURE — G0378 HOSPITAL OBSERVATION PER HR: HCPCS

## 2021-02-23 PROCEDURE — 93005 ELECTROCARDIOGRAM TRACING: CPT | Performed by: EMERGENCY MEDICINE

## 2021-02-23 PROCEDURE — 99285 EMERGENCY DEPT VISIT HI MDM: CPT

## 2021-02-23 PROCEDURE — 85730 THROMBOPLASTIN TIME PARTIAL: CPT | Performed by: NURSE PRACTITIONER

## 2021-02-23 PROCEDURE — 84484 ASSAY OF TROPONIN QUANT: CPT | Performed by: INTERNAL MEDICINE

## 2021-02-23 PROCEDURE — U0004 COV-19 TEST NON-CDC HGH THRU: HCPCS | Performed by: NURSE PRACTITIONER

## 2021-02-23 PROCEDURE — 83880 ASSAY OF NATRIURETIC PEPTIDE: CPT | Performed by: NURSE PRACTITIONER

## 2021-02-23 PROCEDURE — 71045 X-RAY EXAM CHEST 1 VIEW: CPT

## 2021-02-23 PROCEDURE — 83690 ASSAY OF LIPASE: CPT | Performed by: NURSE PRACTITIONER

## 2021-02-23 PROCEDURE — 84484 ASSAY OF TROPONIN QUANT: CPT | Performed by: NURSE PRACTITIONER

## 2021-02-23 PROCEDURE — 93010 ELECTROCARDIOGRAM REPORT: CPT | Performed by: INTERNAL MEDICINE

## 2021-02-23 PROCEDURE — 93005 ELECTROCARDIOGRAM TRACING: CPT

## 2021-02-23 PROCEDURE — 80053 COMPREHEN METABOLIC PANEL: CPT | Performed by: NURSE PRACTITIONER

## 2021-02-23 PROCEDURE — C9803 HOPD COVID-19 SPEC COLLECT: HCPCS

## 2021-02-23 PROCEDURE — 85025 COMPLETE CBC W/AUTO DIFF WBC: CPT | Performed by: NURSE PRACTITIONER

## 2021-02-23 PROCEDURE — 85610 PROTHROMBIN TIME: CPT | Performed by: NURSE PRACTITIONER

## 2021-02-23 PROCEDURE — 70450 CT HEAD/BRAIN W/O DYE: CPT

## 2021-02-23 RX ORDER — ASPIRIN 325 MG
325 TABLET, DELAYED RELEASE (ENTERIC COATED) ORAL DAILY
Status: DISCONTINUED | OUTPATIENT
Start: 2021-02-23 | End: 2021-02-25 | Stop reason: HOSPADM

## 2021-02-23 RX ORDER — AMLODIPINE BESYLATE 5 MG/1
5 TABLET ORAL DAILY
Status: DISCONTINUED | OUTPATIENT
Start: 2021-02-23 | End: 2021-02-25 | Stop reason: HOSPADM

## 2021-02-23 RX ORDER — CLOPIDOGREL BISULFATE 75 MG/1
75 TABLET ORAL DAILY
Status: DISCONTINUED | OUTPATIENT
Start: 2021-02-23 | End: 2021-02-25 | Stop reason: HOSPADM

## 2021-02-23 RX ORDER — ATORVASTATIN CALCIUM 20 MG/1
40 TABLET, FILM COATED ORAL NIGHTLY
Status: DISCONTINUED | OUTPATIENT
Start: 2021-02-23 | End: 2021-02-25 | Stop reason: HOSPADM

## 2021-02-23 RX ORDER — LOSARTAN POTASSIUM 100 MG/1
100 TABLET ORAL DAILY
Status: DISCONTINUED | OUTPATIENT
Start: 2021-02-23 | End: 2021-02-25 | Stop reason: HOSPADM

## 2021-02-23 RX ADMIN — NITROGLYCERIN 1 INCH: 20 OINTMENT TOPICAL at 17:28

## 2021-02-23 RX ADMIN — SODIUM CHLORIDE 500 ML: 9 INJECTION, SOLUTION INTRAVENOUS at 17:29

## 2021-02-23 RX ADMIN — ATORVASTATIN CALCIUM 40 MG: 20 TABLET, FILM COATED ORAL at 23:12

## 2021-02-23 RX ADMIN — CLOPIDOGREL 75 MG: 75 TABLET, FILM COATED ORAL at 23:13

## 2021-02-24 LAB
ANION GAP SERPL CALCULATED.3IONS-SCNC: 9.3 MMOL/L (ref 5–15)
BUN SERPL-MCNC: 15 MG/DL (ref 6–20)
BUN/CREAT SERPL: 15.8 (ref 7–25)
CALCIUM SPEC-SCNC: 8.3 MG/DL (ref 8.6–10.5)
CHLORIDE SERPL-SCNC: 103 MMOL/L (ref 98–107)
CO2 SERPL-SCNC: 21.7 MMOL/L (ref 22–29)
CREAT SERPL-MCNC: 0.95 MG/DL (ref 0.76–1.27)
GFR SERPL CREATININE-BSD FRML MDRD: 83 ML/MIN/1.73
GLUCOSE SERPL-MCNC: 146 MG/DL (ref 65–99)
POTASSIUM SERPL-SCNC: 3.7 MMOL/L (ref 3.5–5.2)
SODIUM SERPL-SCNC: 134 MMOL/L (ref 136–145)
TROPONIN T SERPL-MCNC: <0.01 NG/ML (ref 0–0.03)
TROPONIN T SERPL-MCNC: <0.01 NG/ML (ref 0–0.03)

## 2021-02-24 PROCEDURE — 99220 PR INITIAL OBSERVATION CARE/DAY 70 MINUTES: CPT | Performed by: INTERNAL MEDICINE

## 2021-02-24 PROCEDURE — G0378 HOSPITAL OBSERVATION PER HR: HCPCS

## 2021-02-24 PROCEDURE — 84484 ASSAY OF TROPONIN QUANT: CPT | Performed by: INTERNAL MEDICINE

## 2021-02-24 PROCEDURE — 80048 BASIC METABOLIC PNL TOTAL CA: CPT | Performed by: INTERNAL MEDICINE

## 2021-02-24 RX ORDER — LOSARTAN POTASSIUM 100 MG/1
100 TABLET ORAL ONCE
Status: COMPLETED | OUTPATIENT
Start: 2021-02-24 | End: 2021-02-24

## 2021-02-24 RX ORDER — AMLODIPINE BESYLATE 5 MG/1
5 TABLET ORAL ONCE
Status: COMPLETED | OUTPATIENT
Start: 2021-02-24 | End: 2021-02-24

## 2021-02-24 RX ORDER — SODIUM CHLORIDE 0.9 % (FLUSH) 0.9 %
10 SYRINGE (ML) INJECTION EVERY 12 HOURS SCHEDULED
Status: DISCONTINUED | OUTPATIENT
Start: 2021-02-24 | End: 2021-02-25 | Stop reason: HOSPADM

## 2021-02-24 RX ORDER — SODIUM CHLORIDE 0.9 % (FLUSH) 0.9 %
10 SYRINGE (ML) INJECTION AS NEEDED
Status: DISCONTINUED | OUTPATIENT
Start: 2021-02-24 | End: 2021-02-25 | Stop reason: HOSPADM

## 2021-02-24 RX ORDER — HYDRALAZINE HYDROCHLORIDE 20 MG/ML
10 INJECTION INTRAMUSCULAR; INTRAVENOUS EVERY 6 HOURS PRN
Status: DISCONTINUED | OUTPATIENT
Start: 2021-02-24 | End: 2021-02-25 | Stop reason: HOSPADM

## 2021-02-24 RX ORDER — ACETAMINOPHEN 325 MG/1
650 TABLET ORAL EVERY 4 HOURS PRN
Status: DISCONTINUED | OUTPATIENT
Start: 2021-02-24 | End: 2021-02-25 | Stop reason: HOSPADM

## 2021-02-24 RX ADMIN — SODIUM CHLORIDE, PRESERVATIVE FREE 10 ML: 5 INJECTION INTRAVENOUS at 20:48

## 2021-02-24 RX ADMIN — SODIUM CHLORIDE, PRESERVATIVE FREE 10 ML: 5 INJECTION INTRAVENOUS at 08:34

## 2021-02-24 RX ADMIN — AMLODIPINE BESYLATE 5 MG: 5 TABLET ORAL at 16:21

## 2021-02-24 RX ADMIN — LOSARTAN POTASSIUM 100 MG: 100 TABLET, FILM COATED ORAL at 17:31

## 2021-02-24 RX ADMIN — AMLODIPINE BESYLATE 5 MG: 5 TABLET ORAL at 22:35

## 2021-02-24 RX ADMIN — ATORVASTATIN CALCIUM 40 MG: 20 TABLET, FILM COATED ORAL at 20:47

## 2021-02-25 VITALS
SYSTOLIC BLOOD PRESSURE: 166 MMHG | RESPIRATION RATE: 16 BRPM | HEIGHT: 70 IN | OXYGEN SATURATION: 97 % | DIASTOLIC BLOOD PRESSURE: 97 MMHG | BODY MASS INDEX: 27.82 KG/M2 | HEART RATE: 63 BPM | WEIGHT: 194.3 LBS | TEMPERATURE: 97.6 F

## 2021-02-25 PROCEDURE — G0378 HOSPITAL OBSERVATION PER HR: HCPCS

## 2021-02-25 PROCEDURE — 99217 PR OBSERVATION CARE DISCHARGE MANAGEMENT: CPT | Performed by: NURSE PRACTITIONER

## 2021-02-25 RX ORDER — AMLODIPINE BESYLATE 5 MG/1
5 TABLET ORAL DAILY
Qty: 30 TABLET | Refills: 11 | Status: SHIPPED | OUTPATIENT
Start: 2021-02-26 | End: 2021-03-04

## 2021-02-25 RX ORDER — LOSARTAN POTASSIUM 100 MG/1
100 TABLET ORAL DAILY
Qty: 30 TABLET | Refills: 11 | Status: SHIPPED | OUTPATIENT
Start: 2021-02-26 | End: 2021-03-04

## 2021-02-25 RX ORDER — CLOPIDOGREL BISULFATE 75 MG/1
75 TABLET ORAL DAILY
Qty: 90 TABLET | Refills: 3 | Status: SHIPPED | OUTPATIENT
Start: 2021-02-25 | End: 2021-03-04

## 2021-02-25 RX ORDER — ATORVASTATIN CALCIUM 40 MG/1
40 TABLET, FILM COATED ORAL NIGHTLY
Qty: 90 TABLET | Refills: 3 | Status: SHIPPED | OUTPATIENT
Start: 2021-02-25 | End: 2021-03-04

## 2021-02-25 RX ADMIN — ASPIRIN 325 MG: 325 TABLET, COATED ORAL at 08:17

## 2021-02-25 RX ADMIN — CLOPIDOGREL 75 MG: 75 TABLET, FILM COATED ORAL at 08:17

## 2021-02-25 RX ADMIN — SODIUM CHLORIDE, PRESERVATIVE FREE 10 ML: 5 INJECTION INTRAVENOUS at 08:16

## 2021-03-04 ENCOUNTER — TELEMEDICINE (OUTPATIENT)
Dept: CARDIOLOGY | Facility: CLINIC | Age: 56
End: 2021-03-04

## 2021-03-04 VITALS
HEART RATE: 70 BPM | BODY MASS INDEX: 27.63 KG/M2 | WEIGHT: 193 LBS | HEIGHT: 70 IN | SYSTOLIC BLOOD PRESSURE: 202 MMHG | DIASTOLIC BLOOD PRESSURE: 108 MMHG

## 2021-03-04 DIAGNOSIS — I10 ESSENTIAL HYPERTENSION: ICD-10-CM

## 2021-03-04 DIAGNOSIS — I25.10 CORONARY ARTERY DISEASE INVOLVING NATIVE CORONARY ARTERY OF NATIVE HEART WITHOUT ANGINA PECTORIS: Primary | ICD-10-CM

## 2021-03-04 DIAGNOSIS — R07.9 CHEST PAIN IN ADULT: Primary | ICD-10-CM

## 2021-03-04 DIAGNOSIS — E78.2 MIXED HYPERLIPIDEMIA: ICD-10-CM

## 2021-03-04 DIAGNOSIS — I25.10 CORONARY ARTERY DISEASE INVOLVING NATIVE CORONARY ARTERY OF NATIVE HEART WITHOUT ANGINA PECTORIS: ICD-10-CM

## 2021-03-04 PROCEDURE — 99214 OFFICE O/P EST MOD 30 MIN: CPT | Performed by: NURSE PRACTITIONER

## 2021-03-04 RX ORDER — AMLODIPINE BESYLATE 10 MG/1
10 TABLET ORAL DAILY
Qty: 30 TABLET | Refills: 11 | Status: SHIPPED | OUTPATIENT
Start: 2021-03-04 | End: 2022-03-11 | Stop reason: SDUPTHER

## 2021-03-04 RX ORDER — LOSARTAN POTASSIUM 100 MG/1
100 TABLET ORAL DAILY
Qty: 30 TABLET | Refills: 11 | Status: SHIPPED | OUTPATIENT
Start: 2021-03-04 | End: 2022-03-11 | Stop reason: SDUPTHER

## 2021-03-04 RX ORDER — ADHESIVE BANDAGE 3/4"
BANDAGE TOPICAL
Qty: 1 EACH | Refills: 0 | Status: SHIPPED | OUTPATIENT
Start: 2021-03-04

## 2021-03-04 RX ORDER — ATORVASTATIN CALCIUM 40 MG/1
40 TABLET, FILM COATED ORAL NIGHTLY
Qty: 30 TABLET | Refills: 11 | Status: SHIPPED | OUTPATIENT
Start: 2021-03-04 | End: 2022-03-11 | Stop reason: SDUPTHER

## 2021-03-04 RX ORDER — CLOPIDOGREL BISULFATE 75 MG/1
75 TABLET ORAL DAILY
Qty: 30 TABLET | Refills: 11 | Status: SHIPPED | OUTPATIENT
Start: 2021-03-04 | End: 2022-03-11 | Stop reason: SDUPTHER

## 2021-03-04 NOTE — PROGRESS NOTES
Date of Office Visit: 2021  Encounter Provider: FIDEL Davis  Place of Service: [facility] CARDIOLOG[facility]  Patient Name: Jaycob Brian  :1965    Chief Complaint   Patient presents with   • Hospital Follow Up Visit   • Coronary Artery Disease   • Hypertension   :     HPI: Jaycob Brian is a 55-year-old male who is a patient of Dr. Woods and is known to me from previous.  He had a non-ST elevation MI last year and got a stent to his LAD.  He also has a history of hypertension, tobacco abuse and renal cell cancer.  He came to the hospital last week complaining of chest pain his EKG and troponins were all negative and we restarted his medications.  This helped him feel better his blood pressure stabilized and he went home.  He had trouble getting his medicines because he does not have a car and does not have a lot of money so we got him his medicines at our [facility] pharmacy here.  He is presenting today for 1 week follow-up.  He is not having any symptoms.  He feels like his blood pressure is doing better.  However he does have an old blood pressure cuff and this is giving him high readings.  He did put new batteries in it but it is still running high.  He denies any chest pain.  He has been trying to watch his salt and cut back on his cigarette use.    Previous testing and notes have been reviewed by me.   Past Medical History:   Diagnosis Date   • Hypertension    • NSTEMI (non-ST elevated myocardial infarction) (CMS/[facility])    • NSTEMI (non-ST elevated myocardial infarction) (CMS/HCC) 2020   • Renal cancer, right (CMS/[facility])        Past Surgical History:   Procedure Laterality Date   • CARDIAC CATHETERIZATION N/A 2020    Procedure: Left Heart Cath;  Surgeon: Sha Woods MD;  Location: [facility] CATH INVASIVE LOCATION;  Service: Cardiovascular;  Laterality: N/A;   • CARDIAC CATHETERIZATION N/A 2020    Procedure: Coronary angiography;  Surgeon:  Sha Woods MD;  Location:  [facility] CATH INVASIVE LOCATION;  Service: Cardiovascular;  Laterality: N/A;   • CARDIAC CATHETERIZATION N/A 7/26/2020    Procedure: Stent IRENE coronary;  Surgeon: Sha Woods MD;  Location: [facility] CATH INVASIVE LOCATION;  Service: Cardiovascular;  Laterality: N/A;       Social History     Socioeconomic History   • Marital status:      Spouse name: Not on file   • Number of children: Not on file   • Years of education: Not on file   • Highest education level: Not on file   Tobacco Use   • Smoking status: Current Every Day Smoker     Packs/day: 1.00   • Smokeless tobacco: Never Used   Substance and Sexual Activity   • Alcohol use: Never     Frequency: Never   • Drug use: Yes     Frequency: 2.0 times per week     Types: Marijuana       History reviewed. No pertinent family history.    Review of Systems   Constitution: Negative for diaphoresis and malaise/fatigue.   Cardiovascular: Negative for chest pain, claudication, dyspnea on exertion, irregular heartbeat, leg swelling, near-syncope, orthopnea, palpitations, paroxysmal nocturnal dyspnea and syncope.   Respiratory: Negative for cough, shortness of breath and sleep disturbances due to breathing.    Musculoskeletal: Negative for falls.   Gastrointestinal: Bloating: misc.   Neurological: Negative for dizziness and weakness.   Psychiatric/Behavioral: Negative for altered mental status and substance abuse.       Allergies   Allergen Reactions   • Penicillins Anaphylaxis   • Amoxicillin GI Intolerance         Current Outpatient Medications:   •  amLODIPine (NORVASC) 5 MG tablet, Take 1 tablet by mouth Daily., Disp: 30 tablet, Rfl: 11  •  aspirin 81 MG EC tablet, Take 1 tablet by mouth Daily., Disp: 90 tablet, Rfl: 3  •  atorvastatin (LIPITOR) 40 MG tablet, Take 1 tablet by mouth Every Night., Disp: 90 tablet, Rfl: 3  •  clopidogrel (PLAVIX) 75 MG tablet, Take 1 tablet by mouth Daily., Disp: 90 tablet, Rfl: 3  •  losartan  "(COZAAR) 100 MG tablet, Take 1 tablet by mouth Daily., Disp: 30 tablet, Rfl: 11      Objective:     Vitals:    03/04/21 1313 03/04/21 1322   BP: (!) 186/121 (!) 202/108   BP Location: Left arm Left arm   Pulse: 70    Weight: 87.5 kg (193 lb)    Height: 177.8 cm (70\")      Body mass index is 27.69 kg/m².    PHYSICAL EXAM:    Constitutional:       Appearance: Well-developed.   HENT:      Head: Normocephalic.   Neck:      Musculoskeletal: Normal range of motion.   Neurological:      Mental Status: Alert and oriented to person, place, and time.         Procedures      Assessment:       Diagnosis Plan   1. Chest pain in adult     2. Coronary artery disease involving native coronary artery of native heart without angina pectoris     3. Essential hypertension     4. Mixed hyperlipidemia       No orders of the defined types were placed in this encounter.         Plan:       I am going to increase his Norvasc to 10 mg a day.  He is going to go to his pharmacy and  a new blood pressure cuff.  I sent a prescription over for 1 and hopefully his insurance will cover it.  Also sending refills of his medicines to his local pharmacy in [place].  He lives far away and would like to see a cardiologist closer to his home working to put in a referral there.    I spent 20minutes preparing, obtaining and reviewing patient's history and previous testing, seeing the patient and examination, counseling and educating and coordinating care.       Your medication list          Accurate as of March 4, 2021  1:46 PM. If you have any questions, ask your nurse or doctor.            CONTINUE taking these medications      Instructions Last Dose Given Next Dose Due   amLODIPine 5 MG tablet  Commonly known as: NORVASC      Take 1 tablet by mouth Daily.       Aspirin Adult Low Strength 81 MG EC tablet  Generic drug: aspirin      Take 1 tablet by mouth Daily.       atorvastatin 40 MG tablet  Commonly known as: LIPITOR      Take 1 tablet by " mouth Every Night.       clopidogrel 75 MG tablet  Commonly known as: PLAVIX      Take 1 tablet by mouth Daily.       losartan 100 MG tablet  Commonly known as: COZAAR      Take 1 tablet by mouth Daily.                As always, it has been a pleasure to participate in your patient's care.      Sincerely,     Alissa PARRA

## 2021-03-17 DIAGNOSIS — I25.119 CORONARY ARTERY DISEASE INVOLVING NATIVE CORONARY ARTERY OF NATIVE HEART WITH ANGINA PECTORIS (HCC): Primary | ICD-10-CM

## 2021-05-18 NOTE — PROGRESS NOTES
Jaycob Brian  1965     Office/Outpatient Visit    Visit Date: Thu, Jan 16, 2020 01:40 pm    Provider: Blaise Shannon MD (Assistant: Spurling, Sarah C, MA)    Location: [facility]        Electronically signed by Blaise Shannon MD on  02/04/2020 08:38:26 AM                             Subjective:        CC: Jayy is a 54 year old White male.  He presents with chills, cough, and cold symptoms.  Pt is currently not taking ANY of the medications in his chart, they will need to be removed.          HPI:           Concerning acute upper respiratory infection, unspecified, these have been present for the past 2 weeks.  The symptoms include body aches, chest congestion, Chills, cough, ear complaints, headache, nasal congestion, nasal discharge, sinus pain/pressure and wheezing.  He denies exposure to ill contacts.  He has not tried any medications for symptomatic relief.  Medical history is significant for diabetes, renal insufficiency, and smoking.      ROS:     CONSTITUTIONAL:  Positive for chills and fatigue.   Negative for fever.      EYES:  Negative for blurred vision.      E/N/T:  Positive for ear pain ( bilateral ), nasal congestion, frequent rhinorrhea and sinus pressure.   Negative for tinnitus, hoarseness or sore throat.      CARDIOVASCULAR:  Negative for chest pain, dizziness, palpitations and edema.      RESPIRATORY:  Positive for frequent wheezing, chest congestion and cough.   Negative for dyspnea.      GASTROINTESTINAL:  Positive for nausea.   Negative for abdominal pain, diarrhea, heartburn or vomiting.      MUSCULOSKELETAL:  Positive for arthralgias and myalgias.      INTEGUMENTARY:  Negative for rash.      NEUROLOGICAL:  Positive for headaches.   Negative for weakness.          Past Medical History / Family History / Social History:         Last Reviewed on 2/04/2020 08:37 AM by Blaise Shannon    Past Medical History:     Hospitalizations: [facility]         Surgical History:          Tonsillectomy/Adenoidectomy     renal carcinoma    GERD    lung granuloma    DEMOND/depression    appendix cancer         Family History:         Positive for Lung Cancer ( mother ).      Positive for Type 2 Diabetes ( father ).          Social History:     Occupation: as a hydraulic tech     Marital Status:      Children: 2 children         Tobacco/Alcohol/Supplements:     Last Reviewed on 2/04/2020 08:37 AM by Blaise Shannon    Tobacco: Current Smoker: He currently smokes every day, 1/2 to 1 pack per day.  Non-drinker     Caffeine:  He admits to consuming caffeine via coffee ( 3 servings per day ) and tea ( 3 servings per day ).          Substance Abuse History:     Last Reviewed on 2/04/2020 08:37 AM by Blaise Shannon        Marijuana: Prior (no current use).          Mental Health History:     Last Reviewed on 2/04/2020 08:37 AM by Blaise Shannon        Communicable Diseases (eg STDs):     Last Reviewed on 2/04/2020 08:37 AM by Blaise Shannon        Current Problems:     Last Reviewed on 2/04/2020 08:37 AM by Blaise Shannon    Encounter for screening for lipoid disorders    Persistent mood [affective] disorder, unspecified    Personal history of malignant carcinoid tumor of kidney    Type 2 diabetes mellitus without complications    Major depressive disorder, single episode, in full remission    Chronic kidney disease, stage 2 (mild)    Inflamed seborrheic keratosis    Hypo-osmolality and hyponatremia    Pneumonia, unspecified organism        Immunizations:     None        Allergies:     Last Reviewed on 2/04/2020 08:37 AM by Blaise Shannon    Contrast dye:      Biaxin:      Penicillins:          Current Medications:     Last Reviewed on 2/04/2020 08:37 AM by Blaise Shannon    Escitalopram Oxalate 20 mg oral tablet [1 tab daily]    Seroquel  mg oral Tablet, Extended Release 24 hr [one at HS]    Lamotrigine 100 mg oral tablet [Take 1 tablet(s) by mouth bid  ]    Lisinopril 10mg Tablet [1 tab daily]     "Buspirone HCl 15mg Tablet [Take 1 tablet(s) by mouth bid]        Objective:        Vitals:         Current: 1/16/2020 1:48:18 PM    Ht:  5 ft, 10 in;  Wt: 186.2 lbs;  BMI: 26.7T: 98.8 F (oral);  BP: 175/99 mm Hg (right arm, sitting);  P: 69 bpm (right arm (BP Cuff), sitting);  sCr: 1.5 mg/dL;  GFR: 55.87O2 Sat: 97 % (room air)        Repeat:     1:49:49 PM  BP:   170/92mm Hg (left arm, sitting, Second take, heart rate: 72)     Exams:     PHYSICAL EXAM:     GENERAL: Vitals recorded Ill but non toxic appearing;  no apparent distress;     EYES: conjunctiva and cornea are normal;     E/N/T: EARS: both TMs are red;  NOSE: nasal mucosa is erythematous;  OROPHARYNX: posterior pharynx shows no exudate and erythema;     NECK: trachea is midline; thyroid is non-palpable;     RESPIRATORY: Clear to auscultation bilateally; no rales (\"crackles\") present; no rhonchi; no wheezes;     CARDIOVASCULAR: normal rate; rhythm is regular;  No murmurs, clicks, gallops or rubs appreciated; no edema;     LYMPHATIC: bilateral anterior cervical nodes;  no supraclavicular nodes;     SKIN:  No significant rashes, lesions or suspicious moles within limits of examination;     NEUROLOGIC: Grossly intact; mental status: alert and oriented x 3;     PSYCHIATRIC: appropriate affect and demeanor; normal speech pattern; Normal behavior;         Lab/Test Results:         Influenza A and B: Negative (01/16/2020),     Performed by:: tls (01/16/2020),             Assessment:         J18.9   Pneumonia, unspecified organism           ORDERS:         Radiology/Test Orders:       [id number]  Radiologic exam chest 2 views  (Send-Out)              Lab Orders:       [id number]  Infectious agent antigen detection by immunoassay; Influenza  (In-House)            93374-89  Infectious agent antigen detection by immunoassay; Influenza  (In-House)            [id number]  [facility] CBC with 3 part diff  (Send-Out)            [id number]  [facility] Comp. Metabolic Panel  (Send-Out)          "             Plan:         Pneumonia, unspecified organism- Patient was sent for chest x-ray to evaluate pneumonia while in office today given his clinical picture.  This was read as bilateral reticulonodular changes concerning for pneumonia.  Will start him on cefdinir and doxycycline for a 1 week course. OTC cough suppressant/decongestants as needed.  Tylenol and/or Motrin as needed for fever/discomfort. Labs ordered including CBC.  Rapid flu negative in office today.  ED/return precautions given.          Orders:       [id number]  Infectious agent antigen detection by immunoassay; Influenza  (In-House)            [id number]  Infectious agent antigen detection by immunoassay; Influenza  (In-House)            20911  [facility] CBC with 3 part diff  (Send-Out)            [id number]  [facility] - [facility] Comp. Metabolic Panel  (Send-Out)            [id number]  Radiologic exam chest 2 views  (Send-Out)                  Charge Capture:         Primary Diagnosis:     J18.9  Pneumonia, unspecified organism           Orders:      [id number]  Office/outpatient visit; established patient, level 4  (In-House)            [id number]  Infectious agent antigen detection by immunoassay; Influenza  (In-House)            [id number]  Infectious agent antigen detection by immunoassay; Influenza  (In-House)

## 2021-05-18 NOTE — PROGRESS NOTES
Jaycob Brian PABLO  1965     Office/Outpatient Visit    Visit Date: Tue, Aug 4, 2020 01:13 pm    Provider: Blaise Shannon MD (Assistant: Aleida Adler MA)    Location: [facility]        Electronically signed by Blaise Shannon MD on  08/04/2020 05:31:51 PM                             Subjective:        CC: Jayy is a 55 year old White male.  He is here today following a transition of care from an inpatient hospital: [facility]. The patient was admitted on 7-25-20 and discharged on 7-27-20. The patient was admitted for NSTEMI. Our office called the patient within 48 hours of discharge and scheduled the follow-up appointment. During the patient's hospital stay the patient was treated by Dr. Sha Woods. We attempted to contact the patient several times to discuss condition.  Transition of care appointment was scheduled by hospital within 48 hours of discharge.. Medications have been reviewed and reconciled with discharge summary..          HPI:       Check presents to clinic today as a hospital discharge follow-up of an NSTEMI s/p PCI with stenting to the LAD.He was admitted to [facility] cardiology from 7/25/27/27/20.  He presented to the emergency department with midsternal chest pain with radiation to left arm.  Work-up identified elevated troponins consistent with non-ST elevation MI.  Echocardiogram performed during admission showed no valvular abnormalities and a preserved EF.  He was discharged home in stable condition on atorvastatin 40 mg daily carvedilol 3.125 mg twice daily, aspirin 81 mg daily and Plavix 75 mg daily. Since discharge, he has had no repeat chest pain.  However, he reports that he has been experiencing myalgias of bilateral lower extremities.  Specifically, he says his knees, calves and hips ache. This is especially bad at nighttime. The pain is such that he is not sleeping very well.  He endorses significant fatigue.He denies shortness of breath, edema or  palpitations.  Unfortunately, he does say that his blood pressures been elevated.  This is especially true in office today with systolic blood pressures of 190s to low 200s.  He denies blurry vision, headache or dizziness.    Finally, check reports that he has been exceedingly anxious.  His anxiety symptoms have been present for several months but with his recent heart attack have acutely worsened.  He says he cannot shut his mind off and worries constantly.  His sleep is very poor.    ROS:     CONSTITUTIONAL:  Positive for fatigue.   Negative for chills or fever.      EYES:  Negative for blurred vision.      CARDIOVASCULAR:  Negative for chest pain, dizziness, palpitations and edema.      RESPIRATORY:  Negative for dyspnea and cough.      GASTROINTESTINAL:  Negative for abdominal pain, diarrhea, nausea and vomiting.      MUSCULOSKELETAL:  Positive for arthralgias and myalgias.      INTEGUMENTARY:  Negative for rash.      NEUROLOGICAL:  Negative for headaches, paresthesias and weakness.      PSYCHIATRIC:  Positive for anxiety, depression and sleep disturbance.   Negative for suicidal thoughts.          Past Medical History / Family History / Social History:         Last Reviewed on 8/04/2020 03:38 PM by Blaise Shannon    Past Medical History:     Hospitalizations: [facility]         Surgical History:         Tonsillectomy/Adenoidectomy     renal carcinoma    GERD    lung granuloma    DEMOND/depression    appendix cancer         Family History:         Positive for Lung Cancer ( mother ).      Positive for Type 2 Diabetes ( father ).          Social History:     Occupation: as a hydraulic tech     Marital Status:      Children: 2 children         Tobacco/Alcohol/Supplements:     Last Reviewed on 8/04/2020 03:38 PM by Blaise Shannon    Tobacco: Current Smoker: He currently smokes every day, 1/2 to 1 pack per day.  Non-drinker     Caffeine:  He admits to consuming caffeine via coffee ( 3 servings per day ) and tea ( 3  servings per day ).          Substance Abuse History:     Last Reviewed on 8/04/2020 03:38 PM by Blaise Shannon        Marijuana: Prior (no current use).          Mental Health History:     Last Reviewed on 8/04/2020 03:38 PM by Blaise Shannon        Communicable Diseases (eg STDs):     Last Reviewed on 8/04/2020 03:38 PM by Blaise Shannon        Current Problems:     Last Reviewed on 8/04/2020 03:38 PM by Blaise Shannon    Encounter for screening for lipoid disorders    Persistent mood [affective] disorder, unspecified    Personal history of malignant carcinoid tumor of kidney    Type 2 diabetes mellitus without complications    Major depressive disorder, single episode, in full remission    Chronic kidney disease, stage 2 (mild)    Inflamed seborrheic keratosis    Hypo-osmolality and hyponatremia    Pneumonia, unspecified organism    Encounter for follow-up examination after completed treatment for conditions other than malignant neoplasm    Hyperlipidemia, unspecified    Generalized anxiety disorder    Essential (primary) hypertension    Hypertensive urgency    Non-ST elevation (NSTEMI) myocardial infarction    Atherosclerotic heart disease of native coronary artery with unspecified angina pectoris        Immunizations:     None        Allergies:     Last Reviewed on 8/04/2020 03:38 PM by Blaise Shannon    Contrast dye:      Biaxin:      Penicillins:      hydrOXYzine HCL:   (Adverse Reaction)        Current Medications:     Last Reviewed on 8/04/2020 03:38 PM by Blaise Shannon    aspirin 81 mg oral tablet,chewable [chew 1 tablet (81 mg) by oral route once daily]    ATORVASTATIN CALCIUM  40 MG TABS  [one tablet at night]    CARVEDILOL  3.125 MG TABS  [q12h]    CLOPIDOGREL  75 MG TABS  [one tablet daily]    NITROGLYCERIN  0.4 MG SUBL         Objective:        Vitals:         Current: 8/4/2020 1:19:47 PM    Ht:  5 ft, 10 in;  Wt: 194.2 lbs;  BMI: 27.9T: 97.6 F (oral);  BP: 191/89 mm Hg (left arm, sitting);  P: 64 bpm  "(right arm (BP Cuff), sitting);  sCr: 1.06 mg/dL;  GFR: 79.59        Repeat:     1:22:53 PM  BP:   203/97mm Hg (left arm, sitting) 2:24:3 PM  BP:   201/106mm Hg (left arm, lying, 20 minutes after taking clonidine 0.1mg) 2:42:26 PM  BP:   215/95mm Hg (left arm, sitting)     Exams:     PHYSICAL EXAM:     GENERAL: Vitals recorded well developed, well nourished;  no apparent distress;     EYES: conjunctiva and cornea are normal;     NECK: trachea is midline; thyroid is non-palpable;     RESPIRATORY: Clear to auscultation bilateally; no rales (\"crackles\") present; no rhonchi; no wheezes;     CARDIOVASCULAR: normal rate; rhythm is regular;  No murmurs, clicks, gallops or rubs appreciated; no edema;     GASTROINTESTINAL: nontender; Soft and nondistended; normal bowel sounds; no organomegaly; no masses;     SKIN:  No significant rashes, lesions or suspicious moles within limits of examination;     NEUROLOGIC: mental status: alert and oriented x 3; Grossly intact;     PSYCHIATRIC: affect/demeanor: depressed;  normal speech pattern; Normal behavior;         Lab/Test Results:         Amphetamines Screen, Urin: Negative (08/04/2020),     BAR-Barbiturates Screen, Urin: Negative (08/04/2020),     Buprenorphine: Negative (08/04/2020),     BZO-Benzodiazepines Screen,Ur: Negative (08/04/2020),     Cocaine(Metab.)Screen, Ur: Negative (08/04/2020),     MDMA-Ecstasy: Negative (08/04/2020),     Met-Methamphetamine: Negative (08/04/2020),     MTD-Methadone Screen, Urine: Negative (08/04/2020),     Opiate Screen, Urine: Negative (08/04/2020),     OXY-Oxycodone: Positive (08/04/2020),     PCP-Phencyclidine Screen, Uri: Negative (08/04/2020),     THC Cannabinoids Screen, Urin: Positive (08/04/2020),     Urine temperature: confirmed (08/04/2020),     Performed by: claudia (08/04/2020),     Collection Time: 1520 (08/04/2020),             Assessment:         I25.119   Atherosclerotic heart disease of native coronary artery with unspecified angina " pectoris       I21.4   Non-ST elevation (NSTEMI) myocardial infarction       E78.5   Hyperlipidemia, unspecified       I10   Essential (primary) hypertension       I16.0   Hypertensive urgency       F41.1   Generalized anxiety disorder           ORDERS:         Meds Prescribed:       [New Rx] cloNIDine HCl 0.1 mg oral tablet [take 1 tablet (0.1 mg) by oral route once], #1 (one) tablet, Refills: 0 (zero)       [New Rx] lisinopriL 10 mg oral tablet [take 1 tablet (10 mg) by oral route once daily], #30 (thirty) tablets, Refills: 0 (zero)       [New Rx] ALPRAZolam 0.5 mg oral tablet [take 1 tablet (0.5 mg) by oral route BID prn], #15 (fifteen) tablets, Refills: 0 (zero)         Lab Orders:       [id number]  Drug test prsmv read direct optical obs pr date  (In-House)              Procedures Ordered:       REFER  Referral to Specialist or Other Facility  (Send-Out)                      Plan:         Atherosclerotic heart disease of native coronary artery with unspecified angina pectoris- Recently diagnosed status post PCI with stent to LAD secondary to non-ST elevation myocardial infarction.  Will refer to cardiology for follow-up as the cardiology service that took care of him in the hospital is too much of a commute.  Continue aspirin 81 mg daily, Plavix 75 mg daily and carvedilol 3.125 mg daily.  Will decrease atorvastatin to 20 mg nightly in the setting of myalgias and arthralgias.    Transition of Care: Patient discharge summary has been reviewed and place in the electronic medical record. Patient was educated on their diagnosis, treatment, and any medication changes while being evaluated           Orders: [id number] Transition of Care 14 day        Non-ST elevation (NSTEMI) myocardial infarction- See above        REFERRALS:  Referral initiated to a cardiologist ( Dr. Patrice Ramos, [facility] ).            Orders:       REFER  Referral to Specialist or Other Facility  (Send-Out)               Hyperlipidemia, unspecified- See above        Essential (primary) hypertension- Very poorly controlled. He was given 0.1 mg of clonidine in my office today without improvement.  Jayy was advised to present to the emergency department for further evaluation and treatment.  He declines referral at this time.  He signed an AGAINST MEDICAL ADVICE form and this is on file.  We will start lisinopril 10 mg daily. Follow-up appointment is been scheduled in 2 days.  Strong ED/return precautions given.        Clonidine 0.1mg given PO at 2:02pm by , filled through [facility] pharmacy. [id number], discard date 8-4-2021           Prescriptions:       [New Rx] lisinopriL 10 mg oral tablet [take 1 tablet (10 mg) by oral route once daily], #30 (thirty) tablets, Refills: 0 (zero)         Hypertensive urgency          Prescriptions:       [New Rx] cloNIDine HCl 0.1 mg oral tablet [take 1 tablet (0.1 mg) by oral route once], #1 (one) tablet, Refills: 0 (zero)         Generalized anxiety disorder- Acute on chronic anxiety.  Will start Xanax for short course.  Discussed possibly starting hydroxyzine but Jayy says that he has increased anxiety and tachycardia with this medication.  We will hold off on a long-term medication/SSRI at this time due to recent cardiac event.    Controlled substance documentation: [place] reviewed; drug screen performed and inconsistent--will send for confirmation; consent is reviewed and signed and on the chart.  UDS POSITIVE FOR OXY AND THC. WHILE OXY WAS DISCLOSED, THC WAS NOT. He is aware of risk of addiction on this medication, understands that he will need to follow up for a review every 3 months and his medications will be adjusted or decreased as deemed appropriate at each visit.  No history of drug or alcohol abuse.  No concerns about diversion or abuse. He denies side effects related to the medication.  He is aware that he may be called in for pill counts.  The dosing of this medication will be reviewed  on a regular basis and reduced if possible..  Ongoing use of a controlled substance is necessary for this patient to have a normal quality of life           Prescriptions:       [New Rx] ALPRAZolam 0.5 mg oral tablet [take 1 tablet (0.5 mg) by oral route BID prn], #15 (fifteen) tablets, Refills: 0 (zero)           Orders:       [id number]  Drug test prsmv read direct optical obs pr date  (In-House)                  Charge Capture:         Primary Diagnosis:     I25.119  Atherosclerotic heart disease of native coronary artery with unspecified angina pectoris           Orders:      [id number]  Office/outpatient visit; established patient, level 4  (In-House)              I21.4  Non-ST elevation (NSTEMI) myocardial infarction     E78.5  Hyperlipidemia, unspecified     I10  Essential (primary) hypertension     I16.0  Hypertensive urgency     F41.1  Generalized anxiety disorder           Orders:      [id number]  Drug test prsmv read direct optical obs pr date  (In-House)

## 2021-05-18 NOTE — PROGRESS NOTES
Jaycob Brian PABLO  1965     Office/Outpatient Visit    Visit Date: Tue, Feb 23, 2021 03:11 pm    Provider: Blaise Shannon MD (Assistant: Eduarda Louis MA)    Location: [facility]        Electronically signed by Blaise Shannon MD on  02/23/2021 06:16:27 PM                             Subjective:        CC: Stopped taking all medications prior to chest painJayy is a 55 year old White male.  Chest pain on Monday and Wednesday last week refused to go to ER, took Nitrostat x2 SL and asa and symptoms stopped, admits headache, slight shortness of breath, dizziness         HPI:       ** Due to complaints of chest pain, SOA and markedly elevated BP, Jayy was sent to the emergency department via EMS. He will not be charged for today's appointment. **    Past Medical History / Family History / Social History:         Last Reviewed on 2/23/2021 06:16 PM by Blaise Shannon    Past Medical History:     Hospitalizations: [facility]         Surgical History:         Tonsillectomy/Adenoidectomy     renal carcinoma    GERD    lung granuloma    DEMOND/depression    appendix cancer         Family History:         Positive for Lung Cancer ( mother ).      Positive for Type 2 Diabetes ( father ).          Social History:     Occupation: as a hydraulic tech     Marital Status:      Children: 2 children         Tobacco/Alcohol/Supplements:     Last Reviewed on 2/23/2021 06:16 PM by Blaise Shannon    Tobacco: Current Smoker: He currently smokes every day, 1 pack per day; 1-1/2 packs per day.  Non-drinker     Caffeine:  He admits to consuming caffeine via coffee ( 3 servings per day ) and tea ( 3 servings per day ).          Substance Abuse History:     Last Reviewed on 2/23/2021 06:16 PM by Blaise Shannon        Marijuana: Prior (no current use).          Mental Health History:     Last Reviewed on 2/23/2021 06:16 PM by Blaise Shannon        Communicable Diseases (eg STDs):     Last Reviewed on 2/23/2021 06:16  PM by Blaise Shannon        Current Problems:     Last Reviewed on 2/23/2021 06:16 PM by Blaise Shannon    Encounter for screening for lipoid disorders    Persistent mood [affective] disorder, unspecified    Personal history of malignant carcinoid tumor of kidney    Type 2 diabetes mellitus without complications    Major depressive disorder, single episode, in full remission    Chronic kidney disease, stage 2 (mild)    Inflamed seborrheic keratosis    Hypo-osmolality and hyponatremia    Pneumonia, unspecified organism    Encounter for follow-up examination after completed treatment for conditions other than malignant neoplasm    Hyperlipidemia, unspecified    Generalized anxiety disorder    Essential (primary) hypertension    Hypertensive urgency    Angina pectoris, unspecified    Non-ST elevation (NSTEMI) myocardial infarction    Atherosclerotic heart disease of native coronary artery with unspecified angina pectoris        Immunizations:     None        Allergies:     Last Reviewed on 2/23/2021 06:16 PM by Blaise Shannon    Contrast dye:      hydrOXYzine HCL:   (Adverse Reaction)    Biaxin:      Penicillins:          Current Medications:     Last Reviewed on 2/23/2021 06:16 PM by Blaise Shannon    aspirin 81 mg oral tablet,chewable [chew 1 tablet (81 mg) by oral route once daily]    NITROGLYCERIN  0.4 MG SUBL     ATORVASTATIN CALCIUM  40 MG TABS  [one tablet at night]    CARVEDILOL  3.125 MG TABS  [q12h]    CLOPIDOGREL  75 MG TABS  [one tablet daily]    ALPRAZolam 0.5 mg oral tablet [take 1 tablet (0.5 mg) by oral route BID prn]    lisinopriL 20 mg oral tablet [take 1 tablet (20 mg) by oral route once daily]        Objective:        Vitals:         Current: 2/23/2021 3:22:33 PM    Ht:  5 ft, 10 in;  Wt: 193.4 lbs;  BMI: 27.7T: 97.4 F (temporal);  BP: 224/114 mm Hg (left arm, sitting);  P: 74 bpm (left arm (BP Cuff), sitting);  sCr: 1.06 mg/dL;  GFR: 79.45        Repeat:     3:25:1 PM  BP:   219/102mm Hg (left arm,  sitting, P 75)     Assessment:         I20.9   Angina pectoris, unspecified       I10   Essential (primary) hypertension           ORDERS:         Other Orders:       NOCHG  No Charge  (In-House)                      Plan:         Angina pectoris, unspecified** Due to complaints of chest pain, SOA and markedly elevated BP, Jayy was sent to the emergency department via EMS. He will not be charged for today's appointment. **          Orders:       NOCHG  No Charge  (In-House)                  Charge Capture:         Primary Diagnosis:     I20.9  Angina pectoris, unspecified           Orders:      NOCHG  No Charge  (In-House)              I10  Essential (primary) hypertension

## 2021-05-18 NOTE — PROGRESS NOTES
Jaycob Brian  1965     Office/Outpatient Visit    Visit Date: Thu, Aug 6, 2020 02:35 pm    Provider: Blaise Shannon MD (Assistant: Alcides Oneill, )    Location: [facility]        Electronically signed by Blaise Shannon MD on  08/06/2020 07:00:23 PM                             Subjective:        CC: Jayy is a 55 year old White male.  This is a follow-up visit.  DOXIMITY VIDEO [id number]        HPI:       Jaycob is being seen today as a follow-up of hypertensive urgency.  He was seen in our clinic 2 days ago as a follow-up from a recent end STEMI.  Upon arriving in clinic, his systolic blood pressures were 190s to 200s.  He denied any symptoms at that time but his pressures did not improve with administration of clonidine.  He was advised to go to the emergency room but declined to do so and signed an AMA form to leave the clinic and go home.  He did agree to follow-up with us today.  Also at last visit, he had complained of poor sleep and significant anxiety.  He was given a short course of Xanax to see if this would help alleviate his symptoms.  He says that this has improved his sleep and he feels better from an anxiety standpoint overall.  At last visit, he was also started on lisinopril 10 mg daily.  He has been checking his blood pressure 2-3 times a day since then and has had readings that average in the mid 140s over 90s but has had 2 readings that reached the 180s to 190 systolic.  He denies headache, blurry vision, chest pain, shortness of breath, edema or palpitations.     Additionally, at last visit he was referred to cardiology here at [facility] as the cardiology service it took care of him the [facility] is too much of a commute for him.  This is been set up and his appointment is 8/18.  Finally, at last visit, he complained of bilateral lower extremity myalgias (particularly hips and knees) that started when he started atorvastatin 80 mg daily.  He was advised to have  this dose.  He has done so and says that his myalgias have improved.    ROS:     CONSTITUTIONAL:  Positive for fatigue.   Negative for chills or fever.      EYES:  Negative for blurred vision.      CARDIOVASCULAR:  Negative for chest pain, dizziness, palpitations and edema.      RESPIRATORY:  Negative for dyspnea and cough.      GASTROINTESTINAL:  Negative for abdominal pain, diarrhea, nausea and vomiting.      MUSCULOSKELETAL:  Positive for Improved arthralgias and myalgias.      INTEGUMENTARY:  Negative for rash.      NEUROLOGICAL:  Negative for headaches, paresthesias and weakness.      PSYCHIATRIC:  Positive for anxiety ( (improved) ), depression ( (improved) ) and (improved).   Negative for suicidal thoughts.          Past Medical History / Family History / Social History:         Last Reviewed on 8/06/2020 07:00 PM by Blaise Shannon    Past Medical History:     Hospitalizations: [facility]         Surgical History:         Tonsillectomy/Adenoidectomy     renal carcinoma    GERD    lung granuloma    DEMOND/depression    appendix cancer         Family History:         Positive for Lung Cancer ( mother ).      Positive for Type 2 Diabetes ( father ).          Social History:     Occupation: as a hydraulic tech     Marital Status:      Children: 2 children         Tobacco/Alcohol/Supplements:     Last Reviewed on 8/06/2020 07:00 PM by Blaise Shannon    Tobacco: Current Smoker: He currently smokes every day, 1/2 to 1 pack per day.  Non-drinker     Caffeine:  He admits to consuming caffeine via coffee ( 3 servings per day ) and tea ( 3 servings per day ).          Substance Abuse History:     Last Reviewed on 8/06/2020 07:00 PM by Blaise Shannon        Marijuana: Prior (no current use).          Mental Health History:     Last Reviewed on 8/06/2020 07:00 PM by Blaise Shannon        Communicable Diseases (eg STDs):     Last Reviewed on 8/06/2020 07:00 PM by Blaise Shannon        Current Problems:     Last Reviewed on  "8/06/2020 07:00 PM by Blaise Shannon    Encounter for screening for lipoid disorders    Persistent mood [affective] disorder, unspecified    Personal history of malignant carcinoid tumor of kidney    Type 2 diabetes mellitus without complications    Major depressive disorder, single episode, in full remission    Chronic kidney disease, stage 2 (mild)    Inflamed seborrheic keratosis    Hypo-osmolality and hyponatremia    Pneumonia, unspecified organism    Encounter for follow-up examination after completed treatment for conditions other than malignant neoplasm    Hyperlipidemia, unspecified    Generalized anxiety disorder    Essential (primary) hypertension    Hypertensive urgency    Non-ST elevation (NSTEMI) myocardial infarction    Atherosclerotic heart disease of native coronary artery with unspecified angina pectoris        Immunizations:     None        Allergies:     Last Reviewed on 8/06/2020 07:00 PM by Blaise Shannon    Contrast dye:      hydrOXYzine HCL:   (Adverse Reaction)    Biaxin:      Penicillins:          Current Medications:     Last Reviewed on 8/06/2020 07:00 PM by Blaise Shannon    aspirin 81 mg oral tablet,chewable [chew 1 tablet (81 mg) by oral route once daily]    NITROGLYCERIN  0.4 MG SUBL     ATORVASTATIN CALCIUM  40 MG TABS  [one tablet at night]    CARVEDILOL  3.125 MG TABS  [q12h]    CLOPIDOGREL  75 MG TABS  [one tablet daily]    lisinopriL 10 mg oral tablet [take 1 tablet (10 mg) by oral route once daily]    ALPRAZolam 0.5 mg oral tablet [take 1 tablet (0.5 mg) by oral route BID prn]        Objective:        Exams:     PHYSICAL EXAM:     GENERAL: Vitals recorded well developed, well nourished;  no apparent distress;     EYES: conjunctiva and cornea are normal;     NECK: trachea is midline; thyroid is non-palpable;     RESPIRATORY: Clear to auscultation bilateally; no rales (\"crackles\") present; no rhonchi; no wheezes;     CARDIOVASCULAR: normal rate; rhythm is regular;  No murmurs, clicks, " gallops or rubs appreciated; no edema;     GASTROINTESTINAL: nontender; Soft and nondistended; normal bowel sounds; no organomegaly; no masses;     SKIN:  No significant rashes, lesions or suspicious moles within limits of examination;     NEUROLOGIC: mental status: alert and oriented x 3; Grossly intact;     PSYCHIATRIC: affect/demeanor: depressed;  normal speech pattern; Normal behavior;         Assessment:         F41.1   Generalized anxiety disorder       I10   Essential (primary) hypertension       I25.119   Atherosclerotic heart disease of native coronary artery with unspecified angina pectoris       I21.4   Non-ST elevation (NSTEMI) myocardial infarction       E78.5   Hyperlipidemia, unspecified           Plan:         Generalized anxiety disorder- Improved.  Continue alprazolam 0.5 mg twice daily as needed.  Again advised patient that this is intended for short-term.  He will likely require controller medication and we will revisit starting this at his follow-up visit in 1 month.        Essential (primary) hypertension- Improved but not at goal.  Increase lisinopril to 20 mg daily.  Continue carvedilol 3.125 mg twice daily.        Atherosclerotic heart disease of native coronary artery with unspecified angina pectorisRecently diagnosed status post PCI with stent to LAD secondary to non-ST elevation myocardial infarction. Referred to cardiology; appointment scheduled for 8/18.  Continue atorvastatin 20 mg daily, Carvedilol 3.125 mg twice daily, Plavix 75 mg daily, aspirin 81 mg daily and lisinopril 20 mg daily.        Non-ST elevation (NSTEMI) myocardial infarction- See above        Hyperlipidemia, unspecified- See above            Charge Capture:         Primary Diagnosis:     F41.1  Generalized anxiety disorder           Orders:      00093  Office/outpatient visit; established patient, level 4  (In-House)              I10  Essential (primary) hypertension     I25.119  Atherosclerotic heart disease of native  coronary artery with unspecified angina pectoris     I21.4  Non-ST elevation (NSTEMI) myocardial infarction     E78.5  Hyperlipidemia, unspecified         ADDENDUMS:      ____________________________________    Addendum: 08/10/2020 02:58 PM - Blaise Shannon        Please add to CC:    Today's encounter is being done with a telehealth visit. He has consented verbally with two witnesses for todays treatment. Todays visit is being conducted by audio and video. Individuals present during the telemedicine consultation include patient, patient's wife and Dr. Shannon.    -kevon

## 2021-07-02 VITALS
BODY MASS INDEX: 27.69 KG/M2 | WEIGHT: 193.4 LBS | HEART RATE: 74 BPM | DIASTOLIC BLOOD PRESSURE: 102 MMHG | TEMPERATURE: 97.4 F | HEIGHT: 70 IN | SYSTOLIC BLOOD PRESSURE: 219 MMHG

## 2021-07-02 VITALS
BODY MASS INDEX: 27.8 KG/M2 | WEIGHT: 194.2 LBS | TEMPERATURE: 97.6 F | HEIGHT: 70 IN | DIASTOLIC BLOOD PRESSURE: 95 MMHG | SYSTOLIC BLOOD PRESSURE: 215 MMHG | HEART RATE: 64 BPM

## 2021-07-02 VITALS
BODY MASS INDEX: 26.66 KG/M2 | HEIGHT: 70 IN | TEMPERATURE: 98.8 F | HEART RATE: 69 BPM | DIASTOLIC BLOOD PRESSURE: 92 MMHG | OXYGEN SATURATION: 97 % | WEIGHT: 186.2 LBS | SYSTOLIC BLOOD PRESSURE: 170 MMHG

## 2021-07-20 ENCOUNTER — TELEPHONE (OUTPATIENT)
Dept: FAMILY MEDICINE CLINIC | Age: 56
End: 2021-07-20

## 2022-03-10 RX ORDER — ATORVASTATIN CALCIUM 40 MG/1
TABLET, FILM COATED ORAL
Qty: 90 TABLET | OUTPATIENT
Start: 2022-03-10

## 2022-03-10 RX ORDER — LOSARTAN POTASSIUM 100 MG/1
TABLET ORAL
Qty: 90 TABLET | OUTPATIENT
Start: 2022-03-10

## 2022-03-11 RX ORDER — AMLODIPINE BESYLATE 10 MG/1
10 TABLET ORAL DAILY
Qty: 30 TABLET | Refills: 0 | Status: SHIPPED | OUTPATIENT
Start: 2022-03-11 | End: 2022-04-15 | Stop reason: SDUPTHER

## 2022-03-11 RX ORDER — LOSARTAN POTASSIUM 100 MG/1
100 TABLET ORAL DAILY
Qty: 30 TABLET | Refills: 0 | Status: SHIPPED | OUTPATIENT
Start: 2022-03-11 | End: 2022-04-15 | Stop reason: SDUPTHER

## 2022-03-11 RX ORDER — CLOPIDOGREL BISULFATE 75 MG/1
75 TABLET ORAL DAILY
Qty: 30 TABLET | Refills: 0 | Status: SHIPPED | OUTPATIENT
Start: 2022-03-11 | End: 2022-04-15 | Stop reason: SDUPTHER

## 2022-03-11 RX ORDER — ATORVASTATIN CALCIUM 40 MG/1
40 TABLET, FILM COATED ORAL NIGHTLY
Qty: 30 TABLET | Refills: 0 | Status: SHIPPED | OUTPATIENT
Start: 2022-03-11 | End: 2022-04-15 | Stop reason: SDUPTHER

## 2022-03-11 RX ORDER — ASPIRIN 81 MG/1
81 TABLET ORAL DAILY
Qty: 30 TABLET | Refills: 0 | Status: SHIPPED | OUTPATIENT
Start: 2022-03-11

## 2022-03-11 NOTE — TELEPHONE ENCOUNTER
Pt was seeing Dr Shannon from 2020-February 2021, went to a practice in [place] and established with FIDEL Hermosillo, has not been seen by anyone since 3/2021, med refills were denied by [facility] Hermos[facility] office, pt gave verbal consent to speak to Ally Webster, advised Ally girard would need to establish with new provider, requested to establish with Dr Sofia who he saw prior to 2016, Dr Sofia not accepting new patients at this time, scheduled new patient appointment with Justina Baker on Monday 3/14/22, pt is completely out of medication, requesting a small refill to get to appointment. Forwarding to on call Dr Montes De Oca for medication refills, and also to Dr Sofia to see if she would be willing to take over patient care again.

## 2022-04-11 RX ORDER — ASPIRIN 81 MG/1
TABLET, COATED ORAL
Qty: 30 TABLET | Refills: 0 | OUTPATIENT
Start: 2022-04-11

## 2022-04-11 RX ORDER — CLOPIDOGREL BISULFATE 75 MG/1
TABLET ORAL
Qty: 30 TABLET | Refills: 0 | OUTPATIENT
Start: 2022-04-11

## 2022-04-15 ENCOUNTER — OFFICE VISIT (OUTPATIENT)
Dept: FAMILY MEDICINE CLINIC | Age: 57
End: 2022-04-15

## 2022-04-15 VITALS
WEIGHT: 186 LBS | SYSTOLIC BLOOD PRESSURE: 144 MMHG | OXYGEN SATURATION: 94 % | TEMPERATURE: 98 F | HEIGHT: 70 IN | HEART RATE: 65 BPM | DIASTOLIC BLOOD PRESSURE: 88 MMHG | BODY MASS INDEX: 26.63 KG/M2

## 2022-04-15 DIAGNOSIS — Z72.0 TOBACCO ABUSE: ICD-10-CM

## 2022-04-15 DIAGNOSIS — I25.10 CORONARY ARTERY DISEASE INVOLVING NATIVE CORONARY ARTERY OF NATIVE HEART WITHOUT ANGINA PECTORIS: ICD-10-CM

## 2022-04-15 DIAGNOSIS — E78.2 MIXED HYPERLIPIDEMIA: ICD-10-CM

## 2022-04-15 DIAGNOSIS — I10 ESSENTIAL HYPERTENSION: Primary | ICD-10-CM

## 2022-04-15 DIAGNOSIS — J01.10 ACUTE FRONTAL SINUSITIS, RECURRENCE NOT SPECIFIED: ICD-10-CM

## 2022-04-15 DIAGNOSIS — J20.9 ACUTE BRONCHITIS, UNSPECIFIED ORGANISM: ICD-10-CM

## 2022-04-15 PROCEDURE — 99214 OFFICE O/P EST MOD 30 MIN: CPT | Performed by: FAMILY MEDICINE

## 2022-04-15 RX ORDER — LOSARTAN POTASSIUM 100 MG/1
100 TABLET ORAL DAILY
Qty: 90 TABLET | Refills: 1 | Status: SHIPPED | OUTPATIENT
Start: 2022-04-15 | End: 2022-11-09

## 2022-04-15 RX ORDER — CLOPIDOGREL BISULFATE 75 MG/1
75 TABLET ORAL DAILY
Qty: 90 TABLET | Refills: 1 | Status: SHIPPED | OUTPATIENT
Start: 2022-04-15 | End: 2022-07-27 | Stop reason: SDUPTHER

## 2022-04-15 RX ORDER — ALBUTEROL SULFATE 90 UG/1
2 AEROSOL, METERED RESPIRATORY (INHALATION) EVERY 4 HOURS PRN
Qty: 18 G | Refills: 0 | Status: SHIPPED | OUTPATIENT
Start: 2022-04-15

## 2022-04-15 RX ORDER — ATORVASTATIN CALCIUM 40 MG/1
40 TABLET, FILM COATED ORAL NIGHTLY
Qty: 90 TABLET | Refills: 1 | Status: SHIPPED | OUTPATIENT
Start: 2022-04-15 | End: 2022-12-20

## 2022-04-15 RX ORDER — PREDNISONE 20 MG/1
TABLET ORAL
Qty: 9 TABLET | Refills: 0 | Status: SHIPPED | OUTPATIENT
Start: 2022-04-15 | End: 2022-08-18

## 2022-04-15 RX ORDER — AMLODIPINE BESYLATE 10 MG/1
10 TABLET ORAL DAILY
Qty: 90 TABLET | Refills: 1 | Status: SHIPPED | OUTPATIENT
Start: 2022-04-15 | End: 2022-10-26

## 2022-04-15 RX ORDER — DEXTROMETHORPHAN HYDROBROMIDE AND PROMETHAZINE HYDROCHLORIDE 15; 6.25 MG/5ML; MG/5ML
5 SYRUP ORAL 4 TIMES DAILY PRN
Qty: 180 ML | Refills: 0 | Status: SHIPPED | OUTPATIENT
Start: 2022-04-15 | End: 2022-08-18

## 2022-04-15 RX ORDER — CEFDINIR 300 MG/1
300 CAPSULE ORAL 2 TIMES DAILY
Qty: 20 CAPSULE | Refills: 0 | Status: SHIPPED | OUTPATIENT
Start: 2022-04-15 | End: 2022-08-18

## 2022-04-15 NOTE — PROGRESS NOTES
Jaycob Brian presents to [facility].    Chief Complaint: chest tightness and cough    Subjective       History of Present Illness:  HPI  Acute onset for past 2 weeks of chest tightness with congestion and cough NP but can be wet or dry sounding, he is a smoker.  He has ear and sinus congestion with intermittent dizziness, and HAs.  No loss of taste or smell. No fevers/chills.      He cant lay down to sleep due to SOA feeling. He is on OTC cough syrup and tylenol    Jaycob is being seen for follow-up for hypertension.  He is overall stable and well-controlled on losartan 100 mg daily and amlodipine 10 mg daily.  He has no signs of side effects or ankle edema.  He tolerates medications well.  He does not check home blood pressure checks.  In addition he has hypercholesterolemia that is stable and well-controlled on atorvastatin.  Labs reviewed today.  He is due for follow-up labs at this time.  He tolerates Lipitor well and has no complaints or side effects with this medication.     In addition Jaycob has underlying history of  STEMI.    He was hospitalized for this and had hypertensive emergency.  He is currently on amlodipine Plavix and losartan as well as a baby aspirin daily.  He is in no acute issues at this time but has yet to see a cardiologist posthospitalization.  At this time I recommend we get him in for follow-up with cardiology in [facility] because he has significant underlying anxiety disorder and does not like to leave the home or travel distances.        Review of Systems:  Review of Systems   Constitutional: Positive for fatigue. Negative for chills and fever.   HENT: Positive for congestion, ear pain and rhinorrhea. Negative for ear discharge and sore throat.    Respiratory: Positive for cough, shortness of breath and wheezing.    Cardiovascular: Negative for palpitations and leg swelling.   Gastrointestinal: Negative for abdominal pain, constipation, diarrhea,  nausea, vomiting and GERD.   Neurological: Positive for dizziness and headache.   Psychiatric/Behavioral: Positive for sleep disturbance.        Objective   Medical History:  Past Medical History:   • Hypertension   • NSTEMI (non-ST elevated myocardial infarction) (HCC)   • NSTEMI (non-ST elevated myocardial infarction) (HCC)   • Renal cancer, right (HCC)     Past Surgical History:   • CARDIAC CATHETERIZATION    Procedure: Left Heart Cath;  Surgeon: Sha Woods MD;  Location: [facility] CATH INVASIVE LOCATION;  Service: Cardiovascular;  Laterality: N/A;   • CARDIAC CATHETERIZATION    Procedure: Coronary angiography;  Surgeon: Sha Woods MD;  Location:  [facility] CATH INVASIVE LOCATION;  Service: Cardiovascular;  Laterality: N/A;   • CARDIAC CATHETERIZATION    Procedure: Stent IRENE coronary;  Surgeon: Sha Woods MD;  Location:  [facility] CATH INVASIVE LOCATION;  Service: Cardiovascular;  Laterality: N/A;      History reviewed. No pertinent family history.  Social History     Tobacco Use   • Smoking status: Current Every Day Smoker     Packs/day: 1.00   • Smokeless tobacco: Never Used   Substance Use Topics   • Alcohol use: Never       Health Maintenance Due   Topic Date Due   • COLORECTAL CANCER SCREENING  Never done   • Pneumococcal Vaccine 0-64 (1 - PCV) Never done   • TDAP/TD VACCINES (1 - Tdap) Never done   • ZOSTER VACCINE (1 of 2) Never done   • HEPATITIS C SCREENING  Never done   • ANNUAL WELLNESS VISIT  Never done   • LIPID PANEL  07/27/2021   • COVID-19 Vaccine (3 - Booster for Pfizer series) 10/19/2021        Immunization History   Administered Date(s) Administered   • COVID-19 (PFIZER) PURPLE CAP 04/28/2021, 05/19/2021       Allergies   Allergen Reactions   • Penicillins Anaphylaxis   • Amoxicillin GI Intolerance        Medications:  Current Outpatient Medications on File Prior to Visit   Medication Sig   • aspirin 81 MG EC tablet Take 1 tablet by mouth Daily.   • Blood Pressure Monitoring (Blood  "Pressure Cuff) misc Dispense 1 Blood pressure cuff   • [DISCONTINUED] amLODIPine (NORVASC) 10 MG tablet Take 1 tablet by mouth Daily.   • [DISCONTINUED] atorvastatin (LIPITOR) 40 MG tablet Take 1 tablet by mouth Every Night.   • [DISCONTINUED] clopidogrel (PLAVIX) 75 MG tablet Take 1 tablet by mouth Daily.   • [DISCONTINUED] losartan (Cozaar) 100 MG tablet Take 1 tablet by mouth Daily.     No current facility-administered medications on file prior to visit.       Vital Signs:   /88 (BP Location: Left arm, Patient Position: Sitting, Cuff Size: Adult)   Pulse 65   Temp 98 °F (36.7 °C) (Oral)   Ht 177.8 cm (70\")   Wt 84.4 kg (186 lb)   SpO2 94% Comment: Room air  BMI 26.69 kg/m²       Physical Exam:  Physical Exam  Vitals and nursing note reviewed.   Constitutional:       General: He is not in acute distress.     Appearance: Normal appearance. He is not ill-appearing, toxic-appearing or diaphoretic.   HENT:      Head: Normocephalic and atraumatic.      Right Ear: Tympanic membrane, ear canal and external ear normal.      Left Ear: Tympanic membrane, ear canal and external ear normal.      Nose: No congestion or rhinorrhea.      Mouth/Throat:      Mouth: Mucous membranes are moist.      Pharynx: Oropharynx is clear. No oropharyngeal exudate or posterior oropharyngeal erythema.   Eyes:      Extraocular Movements: Extraocular movements intact.      Conjunctiva/sclera: Conjunctivae normal.      Pupils: Pupils are equal, round, and reactive to light.   Cardiovascular:      Rate and Rhythm: Normal rate and regular rhythm.      Heart sounds: Normal heart sounds.   Pulmonary:      Effort: Pulmonary effort is normal.      Breath sounds: Normal breath sounds. No wheezing, rhonchi or rales.   Abdominal:      General: Abdomen is flat.      Palpations: Abdomen is soft.   Musculoskeletal:      Cervical back: Neck supple. No rigidity.   Lymphadenopathy:      Cervical: No cervical adenopathy.   Skin:     General: Skin is " warm and dry.   Neurological:      Mental Status: He is alert and oriented to person, place, and time.   Psychiatric:         Mood and Affect: Mood normal.         Behavior: Behavior normal.         Result Review      The following data was reviewed by Bianca Sofia MD on 04/15/2022.  Lab Results   Component Value Date    WBC 9.76 02/23/2021    HGB 16.4 02/23/2021    HCT 48.6 02/23/2021    MCV 92.4 02/23/2021     02/23/2021     Lab Results   Component Value Date    GLUCOSE 146 (H) 02/24/2021    BUN 15 02/24/2021    CREATININE 0.95 02/24/2021    EGFRIFNONA 83 02/24/2021    BCR 15.8 02/24/2021    K 3.7 02/24/2021    CO2 21.7 (L) 02/24/2021    CALCIUM 8.3 (L) 02/24/2021    ALBUMIN 4.30 02/23/2021    LABIL2 0.9 (L) 01/16/2020    AST 17 02/23/2021    ALT 29 02/23/2021     Lab Results   Component Value Date    CHOL 85 07/27/2020    TRIG 102 07/27/2020    HDL 27 (L) 07/27/2020    LDL 38 07/27/2020     No results found for: TSH  No results found for: HGBA1C  No results found for: PSA                    Assessment and Plan:          Diagnoses and all orders for this visit:    1. Essential hypertension (Primary)  -     Comprehensive Metabolic Panel; Future  -     CBC (No Diff); Future    2. Acute frontal sinusitis, recurrence not specified  -     promethazine-dextromethorphan (PROMETHAZINE-DM) 6.25-15 MG/5ML syrup; Take 5 mL by mouth 4 (Four) Times a Day As Needed for Cough.  Dispense: 180 mL; Refill: 0  -     albuterol sulfate  (90 Base) MCG/ACT inhaler; Inhale 2 puffs Every 4 (Four) Hours As Needed for Wheezing.  Dispense: 18 g; Refill: 0  -     cefdinir (OMNICEF) 300 MG capsule; Take 1 capsule by mouth 2 (Two) Times a Day.  Dispense: 20 capsule; Refill: 0  -     predniSONE (DELTASONE) 20 MG tablet; 2 po daily x 3 days, then 1 po x 3 days  Dispense: 9 tablet; Refill: 0    3. Acute bronchitis, unspecified organism  -     promethazine-dextromethorphan (PROMETHAZINE-DM) 6.25-15 MG/5ML syrup; Take 5 mL by  mouth 4 (Four) Times a Day As Needed for Cough.  Dispense: 180 mL; Refill: 0  -     albuterol sulfate  (90 Base) MCG/ACT inhaler; Inhale 2 puffs Every 4 (Four) Hours As Needed for Wheezing.  Dispense: 18 g; Refill: 0  -     cefdinir (OMNICEF) 300 MG capsule; Take 1 capsule by mouth 2 (Two) Times a Day.  Dispense: 20 capsule; Refill: 0  -     predniSONE (DELTASONE) 20 MG tablet; 2 po daily x 3 days, then 1 po x 3 days  Dispense: 9 tablet; Refill: 0    4. Mixed hyperlipidemia  -     Lipid Panel; Future    5. Coronary artery disease involving native coronary artery of native heart without angina pectoris  -     Ambulatory Referral to Cardiology    6. Tobacco abuse    Other orders  -     losartan (Cozaar) 100 MG tablet; Take 1 tablet by mouth Daily.  Dispense: 90 tablet; Refill: 1  -     clopidogrel (PLAVIX) 75 MG tablet; Take 1 tablet by mouth Daily.  Dispense: 90 tablet; Refill: 1  -     atorvastatin (LIPITOR) 40 MG tablet; Take 1 tablet by mouth Every Night.  Dispense: 90 tablet; Refill: 1  -     amLODIPine (NORVASC) 10 MG tablet; Take 1 tablet by mouth Daily.  Dispense: 90 tablet; Refill: 1      We will treat him for bronchitis and sinusitis with Omnicef and tapering prednisone as well as Promethazine DM cough syrup and albuterol inhaler.  Since his Friday evening he is to go the ER if he has worsening shortness of air/difficulty breathing.  We will also set him up for cardiology referral for his STEMI MI and underlying coronary artery disease.  He is to continue his current medications and I have refilled all of these.  His blood pressure stable and well controlled.  His cholesterol is also well controlled on current medications.  He needs updated labs which have been ordered.    Follow Up   Return in about 6 months (around 10/15/2022), or if symptoms worsen or fail to improve, for Recheck.

## 2022-05-05 ENCOUNTER — TELEPHONE (OUTPATIENT)
Dept: FAMILY MEDICINE CLINIC | Age: 57
End: 2022-05-05

## 2022-05-05 NOTE — TELEPHONE ENCOUNTER
Pt's wife answered she is not on verbal release I have asked her to have him call and I will also send him a [employer] message about overdue labs postponed times 2 weeks

## 2022-05-19 ENCOUNTER — TELEPHONE (OUTPATIENT)
Dept: FAMILY MEDICINE CLINIC | Age: 57
End: 2022-05-19

## 2022-05-19 NOTE — TELEPHONE ENCOUNTER
Called pt re fasting labs ordered on 4/15, pt emergency contact said she will bring him in on 6/1 when pt sees Dr Ramos

## 2022-06-02 ENCOUNTER — TELEPHONE (OUTPATIENT)
Dept: FAMILY MEDICINE CLINIC | Age: 57
End: 2022-06-02

## 2022-07-27 RX ORDER — CLOPIDOGREL BISULFATE 75 MG/1
75 TABLET ORAL DAILY
Qty: 90 TABLET | Refills: 1 | Status: SHIPPED | OUTPATIENT
Start: 2022-07-27

## 2022-08-18 ENCOUNTER — OFFICE VISIT (OUTPATIENT)
Dept: FAMILY MEDICINE CLINIC | Age: 57
End: 2022-08-18

## 2022-08-18 ENCOUNTER — LAB (OUTPATIENT)
Dept: LAB | Facility: HOSPITAL | Age: 57
End: 2022-08-18

## 2022-08-18 VITALS
HEIGHT: 70 IN | HEART RATE: 76 BPM | WEIGHT: 178.6 LBS | DIASTOLIC BLOOD PRESSURE: 84 MMHG | SYSTOLIC BLOOD PRESSURE: 132 MMHG | RESPIRATION RATE: 18 BRPM | OXYGEN SATURATION: 97 % | BODY MASS INDEX: 25.57 KG/M2

## 2022-08-18 DIAGNOSIS — F32.A ANXIETY AND DEPRESSION: ICD-10-CM

## 2022-08-18 DIAGNOSIS — F41.9 ANXIETY AND DEPRESSION: ICD-10-CM

## 2022-08-18 DIAGNOSIS — E78.2 MIXED HYPERLIPIDEMIA: ICD-10-CM

## 2022-08-18 DIAGNOSIS — I25.10 CORONARY ARTERY DISEASE INVOLVING NATIVE CORONARY ARTERY OF NATIVE HEART WITHOUT ANGINA PECTORIS: ICD-10-CM

## 2022-08-18 DIAGNOSIS — I10 ESSENTIAL HYPERTENSION: Primary | ICD-10-CM

## 2022-08-18 LAB
ALBUMIN SERPL-MCNC: 4.2 G/DL (ref 3.5–5.2)
ALBUMIN/GLOB SERPL: 1.4 G/DL
ALP SERPL-CCNC: 85 U/L (ref 39–117)
ALT SERPL W P-5'-P-CCNC: 19 U/L (ref 1–41)
ANION GAP SERPL CALCULATED.3IONS-SCNC: 9 MMOL/L (ref 5–15)
AST SERPL-CCNC: 12 U/L (ref 1–40)
BASOPHILS # BLD AUTO: 0.04 10*3/MM3 (ref 0–0.2)
BASOPHILS NFR BLD AUTO: 0.4 % (ref 0–1.5)
BILIRUB SERPL-MCNC: 0.8 MG/DL (ref 0–1.2)
BUN SERPL-MCNC: 7 MG/DL (ref 6–20)
BUN/CREAT SERPL: 5.9 (ref 7–25)
CALCIUM SPEC-SCNC: 9.5 MG/DL (ref 8.6–10.5)
CHLORIDE SERPL-SCNC: 101 MMOL/L (ref 98–107)
CO2 SERPL-SCNC: 27 MMOL/L (ref 22–29)
CREAT SERPL-MCNC: 1.18 MG/DL (ref 0.76–1.27)
DEPRECATED RDW RBC AUTO: 47 FL (ref 37–54)
EGFRCR SERPLBLD CKD-EPI 2021: 72.4 ML/MIN/1.73
EOSINOPHIL # BLD AUTO: 0.21 10*3/MM3 (ref 0–0.4)
EOSINOPHIL NFR BLD AUTO: 2.2 % (ref 0.3–6.2)
ERYTHROCYTE [DISTWIDTH] IN BLOOD BY AUTOMATED COUNT: 13.4 % (ref 12.3–15.4)
GLOBULIN UR ELPH-MCNC: 3 GM/DL
GLUCOSE SERPL-MCNC: 262 MG/DL (ref 65–99)
HCT VFR BLD AUTO: 47.5 % (ref 37.5–51)
HGB BLD-MCNC: 15.9 G/DL (ref 13–17.7)
IMM GRANULOCYTES # BLD AUTO: 0.01 10*3/MM3 (ref 0–0.05)
IMM GRANULOCYTES NFR BLD AUTO: 0.1 % (ref 0–0.5)
LYMPHOCYTES # BLD AUTO: 2.63 10*3/MM3 (ref 0.7–3.1)
LYMPHOCYTES NFR BLD AUTO: 26.9 % (ref 19.6–45.3)
MCH RBC QN AUTO: 31.5 PG (ref 26.6–33)
MCHC RBC AUTO-ENTMCNC: 33.5 G/DL (ref 31.5–35.7)
MCV RBC AUTO: 94.1 FL (ref 79–97)
MONOCYTES # BLD AUTO: 0.84 10*3/MM3 (ref 0.1–0.9)
MONOCYTES NFR BLD AUTO: 8.6 % (ref 5–12)
NEUTROPHILS NFR BLD AUTO: 6.03 10*3/MM3 (ref 1.7–7)
NEUTROPHILS NFR BLD AUTO: 61.8 % (ref 42.7–76)
PLATELET # BLD AUTO: 325 10*3/MM3 (ref 140–450)
PMV BLD AUTO: 10.2 FL (ref 6–12)
POTASSIUM SERPL-SCNC: 4.8 MMOL/L (ref 3.5–5.2)
PROT SERPL-MCNC: 7.2 G/DL (ref 6–8.5)
RBC # BLD AUTO: 5.05 10*6/MM3 (ref 4.14–5.8)
SODIUM SERPL-SCNC: 137 MMOL/L (ref 136–145)
TSH SERPL DL<=0.05 MIU/L-ACNC: 1.09 UIU/ML (ref 0.27–4.2)
WBC NRBC COR # BLD: 9.76 10*3/MM3 (ref 3.4–10.8)

## 2022-08-18 PROCEDURE — 85025 COMPLETE CBC W/AUTO DIFF WBC: CPT

## 2022-08-18 PROCEDURE — 99214 OFFICE O/P EST MOD 30 MIN: CPT | Performed by: NURSE PRACTITIONER

## 2022-08-18 PROCEDURE — 80053 COMPREHEN METABOLIC PANEL: CPT | Performed by: NURSE PRACTITIONER

## 2022-08-18 PROCEDURE — 84443 ASSAY THYROID STIM HORMONE: CPT | Performed by: NURSE PRACTITIONER

## 2022-08-18 PROCEDURE — 36415 COLL VENOUS BLD VENIPUNCTURE: CPT | Performed by: NURSE PRACTITIONER

## 2022-08-18 PROCEDURE — 80061 LIPID PANEL: CPT | Performed by: NURSE PRACTITIONER

## 2022-08-18 NOTE — ASSESSMENT & PLAN NOTE
Continue current rx's and follow up with his PCP, he ate approx one hour ago /burger and fries, will go ahead and get labs

## 2022-08-18 NOTE — ASSESSMENT & PLAN NOTE
Continue lipitor, he should have refills, checking labs, discussed eating healthy and getting regular exercise .

## 2022-08-18 NOTE — PROGRESS NOTES
Jaycob Brian presents to [facility].    Chief Complaint:  Med Refill (Needs refills on ASA 81. Usually sees Dr. Sofia.)  He is here with his ex wife who helps him.         History of Present Illness:  CAD  Here today because he was told he had to follow up, he  Is not sure if he will have any refills of his rx's. He buys ASA but is out of that rx    Anxiety/ Depression  Current medication/none  Tolerating medication No, he says over the last 15 years he has tried rx  Stressors: leaving his room, he says his family life is messed up, his ex wife says he needs help with anxiety and depression and gets irritable  Current symptoms: anxiety and depression    Hypertension:  Current medication: norvasc, losartan   Tolerating Medication: Yes  Checking BP at home and it is: checks his BP and it runs good, he reports he can tell if his bp is up   Needs refills: he is not sure   Labs:  Lab Results       Component                Value               Date                       GLUCOSE                  146 (H)             02/24/2021                 BUN                      15                  02/24/2021                 CREATININE               0.95                02/24/2021                 EGFRIFNONA               83                  02/24/2021                 BCR                      15.8                02/24/2021                 K                        3.7                 02/24/2021                 CO2                      21.7 (L)            02/24/2021                 CALCIUM                  8.3 (L)             02/24/2021                 ALBUMIN                  4.30                02/23/2021                 LABIL2                   0.9 (L)             01/16/2020                 AST                      17                  02/23/2021                 ALT                      29                  02/23/2021                Past Medical History:       Hospitalizations:    Chest pain 2-23-21  "  NSTEMI    adr pcn     Depression     Surgical History:       Heart cath  stent     Tonsillectomy/Adenoidectomy     renal carcinoma 2013  appendix cancer          Family History:         Positive for Lung Cancer ( mother ).   renal age 54    Positive for Type 2 Diabetes ( father ).   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   Respiratory: Negative for cough and shortness of breath.    Cardiovascular: Negative for chest pain, palpitations and leg swelling.          Current Outpatient Medications:   •  albuterol sulfate  (90 Base) MCG/ACT inhaler, Inhale 2 puffs Every 4 (Four) Hours As Needed for Wheezing., Disp: 18 g, Rfl: 0  •  amLODIPine (NORVASC) 10 MG tablet, Take 1 tablet by mouth Daily., Disp: 90 tablet, Rfl: 1  •  aspirin 81 MG EC tablet, Take 1 tablet by mouth Daily., Disp: 30 tablet, Rfl: 0  •  atorvastatin (LIPITOR) 40 MG tablet, Take 1 tablet by mouth Every Night., Disp: 90 tablet, Rfl: 1  •  Blood Pressure Monitoring (Blood Pressure Cuff) misc, Dispense 1 Blood pressure cuff, Disp: 1 each, Rfl: 0  •  clopidogrel (PLAVIX) 75 MG tablet, Take 1 tablet by mouth Daily., Disp: 90 tablet, Rfl: 1  •  losartan (Cozaar) 100 MG tablet, Take 1 tablet by mouth Daily., Disp: 90 tablet, Rfl: 1    Vital Signs:   Vitals:    22 1344   BP: 132/84   BP Location: Right arm   Patient Position: Sitting   Cuff Size: Adult   Pulse: 76   Resp: 18   SpO2: 97%  Comment: Room air   Weight: 81 kg (178 lb 9.6 oz)   Height: 177.8 cm (70\")         Physical Exam:  Physical Exam  Vitals reviewed.   Constitutional:       General: He is not in acute distress.     Appearance: Normal appearance.   Neck:      Vascular: No carotid bruit.   Cardiovascular:      Rate and Rhythm: " Normal rate and regular rhythm.      Heart sounds: Normal heart sounds. No murmur heard.  Pulmonary:      Effort: Pulmonary effort is normal. No respiratory distress.      Breath sounds: Normal breath sounds.   Musculoskeletal:      Right lower leg: No edema.      Left lower leg: No edema.   Neurological:      Mental Status: He is alert.   Psychiatric:         Mood and Affect: Mood is anxious.         Result Review      The following data was reviewed by: FIDEL Rubalcava on 08/18/2022:    Results for orders placed or performed during the hospital encounter of 02/23/21   COVID-19,[facility] PANTHER,[facility] IN-HOUSE, NP/OP SWAB IN UTM/VTM/[facility] TRANSPORT MEDIA,24 HR TAT - Swab, Nasopharynx    Specimen: Nasopharynx; Swab   Result Value Ref Range    COVID19 Not Detected Not Detected - Ref. Range   Comprehensive Metabolic Panel    Specimen: Blood   Result Value Ref Range    Glucose 193 (H) 65 - 99 mg/dL    BUN 14 6 - 20 mg/dL    Creatinine 1.01 0.76 - 1.27 mg/dL    Sodium 135 (L) 136 - 145 mmol/L    Potassium 4.1 3.5 - 5.2 mmol/L    Chloride 101 98 - 107 mmol/L    CO2 26.0 22.0 - 29.0 mmol/L    Calcium 8.8 8.6 - 10.5 mg/dL    Total Protein 7.0 6.0 - 8.5 g/dL    Albumin 4.30 3.50 - 5.20 g/dL    ALT (SGPT) 29 1 - 41 U/L    AST (SGOT) 17 1 - 40 U/L    Alkaline Phosphatase 77 39 - 117 U/L    Total Bilirubin 1.0 0.0 - 1.2 mg/dL    eGFR Non African Amer 77 >60 mL/min/1.73    Globulin 2.7 gm/dL    A/G Ratio 1.6 g/dL    BUN/Creatinine Ratio 13.9 7.0 - 25.0    Anion Gap 8.0 5.0 - 15.0 mmol/L   Protime-INR    Specimen: Blood   Result Value Ref Range    Protime 13.7 11.7 - 14.2 Seconds    INR 1.07 0.90 - 1.10   aPTT    Specimen: Blood   Result Value Ref Range    PTT 30.7 22.7 - 35.4 seconds   Lipase    Specimen: Blood   Result Value Ref Range    Lipase 28 13 - 60 U/L   BNP    Specimen: Blood   Result Value Ref Range    proBNP 179.6 0.0 - 900.0 pg/mL   Troponin    Specimen: Blood   Result Value Ref Range    Troponin T <0.010  0.000 - 0.030 ng/mL   CBC Auto Differential    Specimen: Blood   Result Value Ref Range    WBC 9.76 3.40 - 10.80 10*3/mm3    RBC 5.26 4.14 - 5.80 10*6/mm3    Hemoglobin 16.4 13.0 - 17.7 g/dL    Hematocrit 48.6 37.5 - 51.0 %    MCV 92.4 79.0 - 97.0 fL    MCH 31.2 26.6 - 33.0 pg    MCHC 33.7 31.5 - 35.7 g/dL    RDW 13.2 12.3 - 15.4 %    RDW-SD 44.4 37.0 - 54.0 fl    MPV 10.4 6.0 - 12.0 fL    Platelets 306 140 - 450 10*3/mm3    Neutrophil % 66.1 42.7 - 76.0 %    Lymphocyte % 20.9 19.6 - 45.3 %    Monocyte % 9.8 5.0 - 12.0 %    Eosinophil % 2.0 0.3 - 6.2 %    Basophil % 0.8 0.0 - 1.5 %    Immature Grans % 0.4 0.0 - 0.5 %    Neutrophils, Absolute 6.44 1.70 - 7.00 10*3/mm3    Lymphocytes, Absolute 2.04 0.70 - 3.10 10*3/mm3    Monocytes, Absolute 0.96 (H) 0.10 - 0.90 10*3/mm3    Eosinophils, Absolute 0.20 0.00 - 0.40 10*3/mm3    Basophils, Absolute 0.08 0.00 - 0.20 10*3/mm3    Immature Grans, Absolute 0.04 0.00 - 0.05 10*3/mm3    nRBC 0.0 0.0 - 0.2 /100 WBC   Troponin    Specimen: Blood   Result Value Ref Range    Troponin T <0.010 0.000 - 0.030 ng/mL   Basic Metabolic Panel    Specimen: Blood   Result Value Ref Range    Glucose 146 (H) 65 - 99 mg/dL    BUN 15 6 - 20 mg/dL    Creatinine 0.95 0.76 - 1.27 mg/dL    Sodium 134 (L) 136 - 145 mmol/L    Potassium 3.7 3.5 - 5.2 mmol/L    Chloride 103 98 - 107 mmol/L    CO2 21.7 (L) 22.0 - 29.0 mmol/L    Calcium 8.3 (L) 8.6 - 10.5 mg/dL    eGFR Non African Amer 83 >60 mL/min/1.73    BUN/Creatinine Ratio 15.8 7.0 - 25.0    Anion Gap 9.3 5.0 - 15.0 mmol/L   Troponin    Specimen: Arm, Right; Blood   Result Value Ref Range    Troponin T <0.010 0.000 - 0.030 ng/mL   Troponin    Specimen: Blood   Result Value Ref Range    Troponin T <0.010 0.000 - 0.030 ng/mL   ECG 12 Lead   Result Value Ref Range    QT Interval 416 ms               Assessment and Plan:          Diagnoses and all orders for this visit:    1. Essential hypertension (Primary)  Assessment & Plan:  Continue current rx's and  follow up with his PCP, he ate approx one hour ago /burger and fries, will go ahead and get labs     Orders:  -     Comprehensive Metabolic Panel  -     Lipid Panel    2. Coronary artery disease involving native coronary artery of native heart without angina pectoris  Assessment & Plan:   his ASA OTC and take daily, follow up with his PCP     Orders:  -     Comprehensive Metabolic Panel  -     Lipid Panel    3. Anxiety and depression  Assessment & Plan:  Recommend, rx, counseling, seeing a specialist, working on his diet, to follow up with his PCP, he declines rx today     Orders:  -     TSH Rfx On Abnormal To Free T4  -     CBC w AUTO Differential; Future    4. Mixed hyperlipidemia  Assessment & Plan:  Continue lipitor, he should have refills, checking labs, discussed eating healthy and getting regular exercise .          Follow Up   Return for follow up with his PCP and pending labs .  Patient was given instructions and counseling regarding his condition or for health maintenance advice. Please see specific information pulled into the AVS if appropriate.

## 2022-08-18 NOTE — ASSESSMENT & PLAN NOTE
Recommend, rx, counseling, seeing a specialist, working on his diet, to follow up with his PCP, he declines rx today

## 2022-08-19 LAB
CHOLEST SERPL-MCNC: 70 MG/DL (ref 0–200)
HDLC SERPL-MCNC: 32 MG/DL (ref 40–60)
LDLC SERPL CALC-MCNC: 20 MG/DL (ref 0–100)
LDLC/HDLC SERPL: 0.63 {RATIO}
TRIGL SERPL-MCNC: 90 MG/DL (ref 0–150)
VLDLC SERPL-MCNC: 18 MG/DL (ref 5–40)

## 2022-08-25 DIAGNOSIS — R73.9 HIGH BLOOD SUGAR: Primary | ICD-10-CM

## 2022-09-07 ENCOUNTER — TELEPHONE (OUTPATIENT)
Dept: FAMILY MEDICINE CLINIC | Age: 57
End: 2022-09-07

## 2022-09-07 NOTE — TELEPHONE ENCOUNTER
Pt was called to discuss the appt that he did not come in for on 9/6/22 with [place]. His caregiver stated that his anxiety was too high and that is why he did not come in for the appt. They rescheduled while we were on the phone.

## 2022-09-20 ENCOUNTER — TELEPHONE (OUTPATIENT)
Dept: FAMILY MEDICINE CLINIC | Age: 57
End: 2022-09-20

## 2022-10-06 ENCOUNTER — TELEPHONE (OUTPATIENT)
Dept: FAMILY MEDICINE CLINIC | Age: 57
End: 2022-10-06

## 2022-10-26 RX ORDER — AMLODIPINE BESYLATE 10 MG/1
TABLET ORAL
Qty: 90 TABLET | Refills: 0 | Status: SHIPPED | OUTPATIENT
Start: 2022-10-26

## 2022-11-09 RX ORDER — LOSARTAN POTASSIUM 100 MG/1
TABLET ORAL
Qty: 90 TABLET | Refills: 0 | Status: SHIPPED | OUTPATIENT
Start: 2022-11-09

## 2022-12-20 RX ORDER — ATORVASTATIN CALCIUM 40 MG/1
TABLET, FILM COATED ORAL
Qty: 30 TABLET | Refills: 0 | Status: SHIPPED | OUTPATIENT
Start: 2022-12-20

## 2024-01-08 RX ORDER — PAROXETINE HYDROCHLORIDE 20 MG/1
20 TABLET, FILM COATED ORAL DAILY
Qty: 90 TABLET | Refills: 1 | OUTPATIENT
Start: 2024-01-08

## 2024-04-22 RX ORDER — PAROXETINE HYDROCHLORIDE 20 MG/1
20 TABLET, FILM COATED ORAL DAILY
Qty: 90 TABLET | Refills: 1 | OUTPATIENT
Start: 2024-04-22

## (undated) DEVICE — CATH VENT MIV RADL PIG ST TIP 5F 110CM

## (undated) DEVICE — GLIDESHEATH SLENDER STAINLESS STEEL KIT: Brand: GLIDESHEATH SLENDER

## (undated) DEVICE — KT MANIFLD CARDIAC

## (undated) DEVICE — TREK CORONARY DILATATION CATHETER 3.50 MM X 15 MM / RAPID-EXCHANGE: Brand: TREK

## (undated) DEVICE — Device: Brand: PROWATER

## (undated) DEVICE — TR BAND RADIAL ARTERY COMPRESSION DEVICE: Brand: TR BAND

## (undated) DEVICE — GW EMR FIX EXCHG J STD .035 3MM 260CM

## (undated) DEVICE — PTCA DILATATION CATHETER: Brand: NC QUANTUM APEX™

## (undated) DEVICE — RADIFOCUS OPTITORQUE ANGIOGRAPHIC CATHETER: Brand: OPTITORQUE

## (undated) DEVICE — CATH GUIDE ZUMA2 EBU 6F 3.5X100CM

## (undated) DEVICE — PK CATH CARD 40